# Patient Record
Sex: FEMALE | Race: OTHER | HISPANIC OR LATINO | URBAN - METROPOLITAN AREA
[De-identification: names, ages, dates, MRNs, and addresses within clinical notes are randomized per-mention and may not be internally consistent; named-entity substitution may affect disease eponyms.]

---

## 2022-10-14 ENCOUNTER — INPATIENT (INPATIENT)
Facility: HOSPITAL | Age: 72
LOS: 3 days | Discharge: HOME | DRG: 872 | End: 2022-10-18
Attending: INTERNAL MEDICINE | Admitting: INTERNAL MEDICINE
Payer: MEDICAID

## 2022-10-14 VITALS
SYSTOLIC BLOOD PRESSURE: 122 MMHG | HEART RATE: 116 BPM | WEIGHT: 167.55 LBS | DIASTOLIC BLOOD PRESSURE: 78 MMHG | TEMPERATURE: 101 F | HEIGHT: 62.2 IN | OXYGEN SATURATION: 96 % | RESPIRATION RATE: 18 BRPM

## 2022-10-14 DIAGNOSIS — L02.91 CUTANEOUS ABSCESS, UNSPECIFIED: ICD-10-CM

## 2022-10-14 LAB
ALBUMIN SERPL ELPH-MCNC: 3.6 G/DL — SIGNIFICANT CHANGE UP (ref 3.5–5)
ALP SERPL-CCNC: 98 U/L — SIGNIFICANT CHANGE UP (ref 40–120)
ALT FLD-CCNC: 13 U/L DA — SIGNIFICANT CHANGE UP (ref 10–60)
ANION GAP SERPL CALC-SCNC: 6 MMOL/L — SIGNIFICANT CHANGE UP (ref 5–17)
APPEARANCE UR: CLEAR — SIGNIFICANT CHANGE UP
APTT BLD: 28.5 SEC — SIGNIFICANT CHANGE UP (ref 27.5–35.5)
AST SERPL-CCNC: 13 U/L — SIGNIFICANT CHANGE UP (ref 10–40)
BACTERIA # UR AUTO: ABNORMAL /HPF
BASOPHILS # BLD AUTO: 0.02 K/UL — SIGNIFICANT CHANGE UP (ref 0–0.2)
BASOPHILS NFR BLD AUTO: 0.1 % — SIGNIFICANT CHANGE UP (ref 0–2)
BILIRUB SERPL-MCNC: 0.8 MG/DL — SIGNIFICANT CHANGE UP (ref 0.2–1.2)
BILIRUB UR-MCNC: NEGATIVE — SIGNIFICANT CHANGE UP
BUN SERPL-MCNC: 18 MG/DL — SIGNIFICANT CHANGE UP (ref 7–18)
CALCIUM SERPL-MCNC: 9.6 MG/DL — SIGNIFICANT CHANGE UP (ref 8.4–10.5)
CHLORIDE SERPL-SCNC: 104 MMOL/L — SIGNIFICANT CHANGE UP (ref 96–108)
CO2 SERPL-SCNC: 28 MMOL/L — SIGNIFICANT CHANGE UP (ref 22–31)
COLOR SPEC: ABNORMAL
CREAT SERPL-MCNC: 0.94 MG/DL — SIGNIFICANT CHANGE UP (ref 0.5–1.3)
DIFF PNL FLD: NEGATIVE — SIGNIFICANT CHANGE UP
EGFR: 65 ML/MIN/1.73M2 — SIGNIFICANT CHANGE UP
EOSINOPHIL # BLD AUTO: 0.09 K/UL — SIGNIFICANT CHANGE UP (ref 0–0.5)
EOSINOPHIL NFR BLD AUTO: 0.5 % — SIGNIFICANT CHANGE UP (ref 0–6)
EPI CELLS # UR: ABNORMAL /HPF
GLUCOSE SERPL-MCNC: 105 MG/DL — HIGH (ref 70–99)
GLUCOSE UR QL: NEGATIVE — SIGNIFICANT CHANGE UP
HCT VFR BLD CALC: 39 % — SIGNIFICANT CHANGE UP (ref 34.5–45)
HGB BLD-MCNC: 13.2 G/DL — SIGNIFICANT CHANGE UP (ref 11.5–15.5)
IMM GRANULOCYTES NFR BLD AUTO: 0.4 % — SIGNIFICANT CHANGE UP (ref 0–0.9)
INR BLD: 1.09 RATIO — SIGNIFICANT CHANGE UP (ref 0.88–1.16)
KETONES UR-MCNC: ABNORMAL
LACTATE SERPL-SCNC: 1.2 MMOL/L — SIGNIFICANT CHANGE UP (ref 0.7–2)
LEUKOCYTE ESTERASE UR-ACNC: ABNORMAL
LYMPHOCYTES # BLD AUTO: 18.6 % — SIGNIFICANT CHANGE UP (ref 13–44)
LYMPHOCYTES # BLD AUTO: 3.12 K/UL — SIGNIFICANT CHANGE UP (ref 1–3.3)
MCHC RBC-ENTMCNC: 30.1 PG — SIGNIFICANT CHANGE UP (ref 27–34)
MCHC RBC-ENTMCNC: 33.8 GM/DL — SIGNIFICANT CHANGE UP (ref 32–36)
MCV RBC AUTO: 88.8 FL — SIGNIFICANT CHANGE UP (ref 80–100)
MONOCYTES # BLD AUTO: 1.41 K/UL — HIGH (ref 0–0.9)
MONOCYTES NFR BLD AUTO: 8.4 % — SIGNIFICANT CHANGE UP (ref 2–14)
NEUTROPHILS # BLD AUTO: 12.1 K/UL — HIGH (ref 1.8–7.4)
NEUTROPHILS NFR BLD AUTO: 72 % — SIGNIFICANT CHANGE UP (ref 43–77)
NITRITE UR-MCNC: NEGATIVE — SIGNIFICANT CHANGE UP
NRBC # BLD: 0 /100 WBCS — SIGNIFICANT CHANGE UP (ref 0–0)
PH UR: 5 — SIGNIFICANT CHANGE UP (ref 5–8)
PLATELET # BLD AUTO: 305 K/UL — SIGNIFICANT CHANGE UP (ref 150–400)
POTASSIUM SERPL-MCNC: 3.9 MMOL/L — SIGNIFICANT CHANGE UP (ref 3.5–5.3)
POTASSIUM SERPL-SCNC: 3.9 MMOL/L — SIGNIFICANT CHANGE UP (ref 3.5–5.3)
PROT SERPL-MCNC: 7.9 G/DL — SIGNIFICANT CHANGE UP (ref 6–8.3)
PROT UR-MCNC: 30 MG/DL
PROTHROM AB SERPL-ACNC: 13 SEC — SIGNIFICANT CHANGE UP (ref 10.5–13.4)
RBC # BLD: 4.39 M/UL — SIGNIFICANT CHANGE UP (ref 3.8–5.2)
RBC # FLD: 12.7 % — SIGNIFICANT CHANGE UP (ref 10.3–14.5)
RBC CASTS # UR COMP ASSIST: SIGNIFICANT CHANGE UP /HPF (ref 0–2)
SARS-COV-2 RNA SPEC QL NAA+PROBE: SIGNIFICANT CHANGE UP
SODIUM SERPL-SCNC: 138 MMOL/L — SIGNIFICANT CHANGE UP (ref 135–145)
SP GR SPEC: 1.02 — SIGNIFICANT CHANGE UP (ref 1.01–1.02)
UROBILINOGEN FLD QL: NEGATIVE — SIGNIFICANT CHANGE UP
WBC # BLD: 16.8 K/UL — HIGH (ref 3.8–10.5)
WBC # FLD AUTO: 16.8 K/UL — HIGH (ref 3.8–10.5)
WBC UR QL: SIGNIFICANT CHANGE UP /HPF (ref 0–5)

## 2022-10-14 PROCEDURE — 99285 EMERGENCY DEPT VISIT HI MDM: CPT

## 2022-10-14 PROCEDURE — 71045 X-RAY EXAM CHEST 1 VIEW: CPT | Mod: 26

## 2022-10-14 RX ORDER — SODIUM CHLORIDE 9 MG/ML
2400 INJECTION INTRAMUSCULAR; INTRAVENOUS; SUBCUTANEOUS ONCE
Refills: 0 | Status: COMPLETED | OUTPATIENT
Start: 2022-10-14 | End: 2022-10-14

## 2022-10-14 RX ADMIN — Medication 600 MILLIGRAM(S): at 18:15

## 2022-10-14 RX ADMIN — SODIUM CHLORIDE 2400 MILLILITER(S): 9 INJECTION INTRAMUSCULAR; INTRAVENOUS; SUBCUTANEOUS at 18:02

## 2022-10-14 RX ADMIN — SODIUM CHLORIDE 2400 MILLILITER(S): 9 INJECTION INTRAMUSCULAR; INTRAVENOUS; SUBCUTANEOUS at 17:02

## 2022-10-14 RX ADMIN — Medication 100 MILLIGRAM(S): at 17:45

## 2022-10-14 NOTE — ED PROVIDER NOTE - OBJECTIVE STATEMENT
73 y/o female comes in complaining of 3 days of vulvar pain with a pimple that is getting bigger and painful. Patient has a fever and is not a diabetic. NKDA.

## 2022-10-14 NOTE — H&P ADULT - ASSESSMENT
72F visiting from [facility], ambulates independently, with PMHx HTN p/w vulvar pain x3d, with associated fever and chills. Patient being admitted for sepsis 2/2 vulvitis w/ cellulitis. GYN consulted.

## 2022-10-14 NOTE — H&P ADULT - PROBLEM SELECTOR PLAN 2
p/w worsening vulvar pain, with associated fever and chills x3d  On exam, left vulva noted to be hard w/ induration w/ surrounding erythema and warmth, extending to left buttock, c/w LEFT vulvitis w/ cellulitis. No area of fluctuance or drainage. No vaginal discharge.  Found to be septic, given IV bolus and IV abx in the ED   will start vanc and cefepime for SSTI, as clinda has higher risk for vaginitis   GYN consulted - not able to santy at this time, c/w IV abx   ID: Dr. Shahid

## 2022-10-14 NOTE — H&P ADULT - HISTORY OF PRESENT ILLNESS
72F visiting from [facility], ambulates independently, with PMHx HTN p/w vulvar pain x3d. Patient states that vulvar pain first began as pimple, which became itchy, and later bigger and more painful. She also endorses fever with chills. She denies any active drainage from the site, but states that the pimple has been getting harder. She denies any associated urinary symptoms. No reported prior history of the vaginal/ Vulvar problems. No reported h/o DM. Patient denies HA, chest pain, SOB, abdominal pain, or changes in BM.  72F visiting from [facility], ambulates independently, with PMHx HTN p/w vulvar pain x3d. Patient states that vulvar pain first began as pimple, which became itchy, and later bigger and more painful. She also endorses fever with chills. She denies any active drainage from the site, but states that the pimple has been getting harder. She denies any associated urinary symptoms. She denies any recent illness or sick contact. No reported prior history of the vaginal/ Vulvar problems. No reported h/o DM. Patient denies HA, chest pain, SOB, cough, abdominal pain, or changes in BM.

## 2022-10-14 NOTE — H&P ADULT - ATTENDING COMMENTS
72F visiting from [facility], ambulates independently, with PMHx HTN p/w vulvar pain x3d. Patient states that vulvar pain first began as pimple, which became itchy, and later bigger and more painful. She also endorses fever with chills. She denies any active drainage from the site, but states that the pimple has been getting harder. She denies any associated urinary symptoms. She denies any recent illness or sick contact. No reported prior history of the vaginal/ Vulvar problems. No reported h/o DM. Patient denies HA, chest pain, SOB, cough, abdominal pain, or changes in BM.        assessment  --  sepsis, left vulvitis with cellulitis, h/o htn    plan  --  admit to med, start vanc and cefepime, cont preadmit home meds, gi and dvt prophylaxis  cbc, bmp, mg, phos, lipids, tsh, bld cx, ucx,    id cons  gyn f/u

## 2022-10-14 NOTE — H&P ADULT - PROBLEM SELECTOR PLAN 1
p/w fever 101F, WBC >16K, lactate wnl,  /78  Likely 2/2 left vulvitis w/ cellulitis   UA -ve   s/p clindamycin x1 and 2.4L NS bolus in the ED   will start vanc and cefepime for SSTI, as clinda has higher risk for vaginitis   c/w IVF @ 100 cc NS x12h  f/u UCx and BCx, already sent  GYN following  ID: Dr. Shahid p/w fever 101F, WBC >16K, lactate wnl,  /78  Likely 2/2 left vulvitis w/ cellulitis   UA -ve   s/p clindamycin x1 and 2.4L NS bolus in the ED   c/w clindamycin 600mg q6  c/w IVF @ 100 cc NS x12h  f/u UCx and BCx, already sent  GYN following  ID: Dr. Shahid

## 2022-10-14 NOTE — ED PROVIDER NOTE - SKIN, MLM
Skin normal color for race, warm, dry and intact. Cellulitis on mons pubis, vulvar abscess on the left side with induration on the labia majora.

## 2022-10-14 NOTE — ED ADULT NURSE NOTE - ED STAT RN HANDOFF DETAILS
pt. remained stable. denies pain. admitted to medicine for PNA. transfer to [facility] area. report given to latrice ng. no acute distress noted

## 2022-10-14 NOTE — H&P ADULT - ATTENDING SUPERVISION STATEMENT
Detail Level: Generalized Detail Level: Zone Detail Level: Detailed Patient Specific Counseling (Will Not Stick From Patient To Patient): Patient advised to use zinc sunscreen Resident

## 2022-10-14 NOTE — H&P ADULT - NSHPPHYSICALEXAM_GEN_ALL_CORE
Vital Signs Last 24 Hrs  T(C): 37.3 (14 Oct 2022 20:15), Max: 38.3 (14 Oct 2022 14:04)  T(F): 99.2 (14 Oct 2022 20:15), Max: 101 (14 Oct 2022 14:04)  HR: 108 (14 Oct 2022 20:15) (108 - 116)  BP: 109/62 (14 Oct 2022 20:15) (109/62 - 122/78)  BP(mean): --  RR: 18 (14 Oct 2022 20:15) (18 - 18)  SpO2: 96% (14 Oct 2022 20:15) (96% - 96%)    Parameters below as of 14 Oct 2022 20:15  Patient On (Oxygen Delivery Method): room air    GENERAL: NAD, lying in bed comfortably  HEAD:  Atraumatic, Normocephalic  EYES: EOMI, PERRLA, conjunctiva and sclera clear  ENT: Moist mucous membranes  NECK: Supple, No JVD  CHEST/LUNG: Clear to auscultation bilaterally; No rales, rhonchi, wheezing, or rubs. Unlabored respirations  HEART: Regular rate and rhythm; No murmurs, rubs, or gallops  ABDOMEN: Bowel sounds present; Soft, Nontender, Nondistended.   EXTREMITIES:  2+ Peripheral Pulses, brisk capillary refill. No clubbing, cyanosis, or edema  NERVOUS SYSTEM:  Alert & Oriented X3, speech clear. No deficits   : (+) LEFT vulvitis, noted with surrounding erythema and warmth, extending to left buttock (+) left vulva hard with induration without area of fluctuance or drainage    Extremities: Nontender, no edema  MSK: FROM all 4 extremities, full and equal strength  SKIN: No rashes or lesions Vital Signs Last 24 Hrs  T(C): 37.3 (14 Oct 2022 20:15), Max: 38.3 (14 Oct 2022 14:04)  T(F): 99.2 (14 Oct 2022 20:15), Max: 101 (14 Oct 2022 14:04)  HR: 108 (14 Oct 2022 20:15) (108 - 116)  BP: 109/62 (14 Oct 2022 20:15) (109/62 - 122/78)  BP(mean): --  RR: 18 (14 Oct 2022 20:15) (18 - 18)  SpO2: 96% (14 Oct 2022 20:15) (96% - 96%)    Parameters below as of 14 Oct 2022 20:15  Patient On (Oxygen Delivery Method): room air    GENERAL: NAD, lying in bed comfortably  HEAD:  Atraumatic, Normocephalic  EYES: EOMI, PERRLA, conjunctiva and sclera clear  ENT: Moist mucous membranes  NECK: Supple, No JVD  CHEST/LUNG: Clear to auscultation bilaterally; No rales, rhonchi, wheezing, or rubs. Unlabored respirations  HEART: Regular rate and rhythm; No murmurs, rubs, or gallops  ABDOMEN: Bowel sounds present; Soft, Nontender, Nondistended.   EXTREMITIES:  2+ Peripheral Pulses, brisk capillary refill. No clubbing, cyanosis, or edema  NERVOUS SYSTEM:  Alert & Oriented X3, speech clear. No deficits   : (+) LEFT vulvitis, noted with surrounding erythema and warmth, extending to left buttock (+) left vulva hard with induration without area of fluctuance or drainage. No vaginal discharge.    Extremities: Nontender, no edema  MSK: FROM all 4 extremities, full and equal strength  SKIN: No rashes or lesions

## 2022-10-14 NOTE — CONSULT NOTE ADULT - SUBJECTIVE AND OBJECTIVE BOX
72 year old  presents to ED with complaint of vaginal abscess for the past 4 days. Pt states it started as a left sided pimple which quickly progressed to a large abscess. Pt c/o pain and discomfort with walking. Pt also states she had fever at home. Pt denies n/v/d, chest pain, sob, dizziness, palpitations     POb/gynhx:    x 5   s/p total hysterectomy for menorrhagia   denies hx of ovarian cysts, fibroids    Allergies: NKA  Meds: antihypertensive med (does not recall name)   PMHx: HTN  PSHx: Total abdominal hysterectomy   PsocHx: denies x 3     REVIEW OF SYSTEMS  see HPI       Vital Signs Last 24 Hrs  T(C): 37.3 (14 Oct 2022 20:15), Max: 38.3 (14 Oct 2022 14:04)  T(F): 99.2 (14 Oct 2022 20:15), Max: 101 (14 Oct 2022 14:04)  HR: 108 (14 Oct 2022 20:15) (108 - 116)  BP: 109/62 (14 Oct 2022 20:15) (109/62 - 122/78)  BP(mean): --  RR: 18 (14 Oct 2022 20:15) (18 - 18)  SpO2: 96% (14 Oct 2022 20:15) (96% - 96%)    Parameters below as of 14 Oct 2022 20:15  Patient On (Oxygen Delivery Method): room air        PHYSICAL EXAM:    Gen: A&O x 3, NAD  Abdomen: +BS; soft; Nontender, nondistended, no rebound or guarding   Gyn: left vulvitis noted with surrounding erythema and warmth extending to left buttock, left vulva hard with induration without area of fluctuance   Extremities: Nontender, no edema    LABS:                        13.2   16.80 )-----------( 305      ( 14 Oct 2022 16:45 )             39.0     10-14    138  |  104  |  18  ----------------------------<  105<H>  3.9   |  28  |  0.94    Ca    9.6      14 Oct 2022 16:45    TPro  7.9  /  Alb  3.6  /  TBili  0.8  /  DBili  x   /  AST  13  /  ALT  13  /  AlkPhos  98  10-14    PT/INR - ( 14 Oct 2022 16:45 )   PT: 13.0 sec;   INR: 1.09 ratio         PTT - ( 14 Oct 2022 16:45 )  PTT:28.5 sec  Urinalysis Basic - ( 14 Oct 2022 16:45 )    Color: Deirdre / Appearance: Clear / S.025 / pH: x  Gluc: x / Ketone: Trace  / Bili: Negative / Urobili: Negative   Blood: x / Protein: 30 mg/dL / Nitrite: Negative   Leuk Esterase: Trace / RBC: 0-2 /HPF / WBC 3-5 /HPF   Sq Epi: x / Non Sq Epi: Occasional /HPF / Bacteria: Few /HPF

## 2022-10-14 NOTE — H&P ADULT - PROBLEM SELECTOR PLAN 3
h/o HTN but unable to recall BP medication, she states daughter showed ED attending medication, no mention of medication name in the chart. Called daughter twice with no answer.  Primary team to confirm medication in the AM.  will hold off on BP meds for now in the setting of sepsis, BP lowered to 109/62  Monitor BP

## 2022-10-14 NOTE — CONSULT NOTE ADULT - ASSESSMENT
72 year old with left vulvitis; stable   -not able to santy at this time   -start IV antibiotics, recommend ID consult   -gyn to follow   -pt seen with Dr. Hardwick

## 2022-10-15 DIAGNOSIS — I10 ESSENTIAL (PRIMARY) HYPERTENSION: ICD-10-CM

## 2022-10-15 DIAGNOSIS — N76.2 ACUTE VULVITIS: ICD-10-CM

## 2022-10-15 DIAGNOSIS — L03.90 CELLULITIS, UNSPECIFIED: ICD-10-CM

## 2022-10-15 DIAGNOSIS — Z29.9 ENCOUNTER FOR PROPHYLACTIC MEASURES, UNSPECIFIED: ICD-10-CM

## 2022-10-15 LAB
ALBUMIN SERPL ELPH-MCNC: 3 G/DL — LOW (ref 3.5–5)
ALP SERPL-CCNC: 80 U/L — SIGNIFICANT CHANGE UP (ref 40–120)
ALT FLD-CCNC: 11 U/L DA — SIGNIFICANT CHANGE UP (ref 10–60)
ANION GAP SERPL CALC-SCNC: 9 MMOL/L — SIGNIFICANT CHANGE UP (ref 5–17)
AST SERPL-CCNC: 9 U/L — LOW (ref 10–40)
BASOPHILS # BLD AUTO: 0.02 K/UL — SIGNIFICANT CHANGE UP (ref 0–0.2)
BASOPHILS NFR BLD AUTO: 0.2 % — SIGNIFICANT CHANGE UP (ref 0–2)
BILIRUB SERPL-MCNC: 0.8 MG/DL — SIGNIFICANT CHANGE UP (ref 0.2–1.2)
BUN SERPL-MCNC: 15 MG/DL — SIGNIFICANT CHANGE UP (ref 7–18)
CALCIUM SERPL-MCNC: 9.4 MG/DL — SIGNIFICANT CHANGE UP (ref 8.4–10.5)
CHLORIDE SERPL-SCNC: 107 MMOL/L — SIGNIFICANT CHANGE UP (ref 96–108)
CO2 SERPL-SCNC: 26 MMOL/L — SIGNIFICANT CHANGE UP (ref 22–31)
CREAT SERPL-MCNC: 0.83 MG/DL — SIGNIFICANT CHANGE UP (ref 0.5–1.3)
EGFR: 75 ML/MIN/1.73M2 — SIGNIFICANT CHANGE UP
EOSINOPHIL # BLD AUTO: 0.33 K/UL — SIGNIFICANT CHANGE UP (ref 0–0.5)
EOSINOPHIL NFR BLD AUTO: 2.6 % — SIGNIFICANT CHANGE UP (ref 0–6)
GLUCOSE SERPL-MCNC: 97 MG/DL — SIGNIFICANT CHANGE UP (ref 70–99)
HCT VFR BLD CALC: 35.9 % — SIGNIFICANT CHANGE UP (ref 34.5–45)
HCV AB S/CO SERPL IA: 0.23 S/CO — SIGNIFICANT CHANGE UP (ref 0–0.99)
HCV AB SERPL-IMP: SIGNIFICANT CHANGE UP
HGB BLD-MCNC: 11.7 G/DL — SIGNIFICANT CHANGE UP (ref 11.5–15.5)
IMM GRANULOCYTES NFR BLD AUTO: 0.4 % — SIGNIFICANT CHANGE UP (ref 0–0.9)
LYMPHOCYTES # BLD AUTO: 2.84 K/UL — SIGNIFICANT CHANGE UP (ref 1–3.3)
LYMPHOCYTES # BLD AUTO: 22.6 % — SIGNIFICANT CHANGE UP (ref 13–44)
MAGNESIUM SERPL-MCNC: 1.9 MG/DL — SIGNIFICANT CHANGE UP (ref 1.6–2.6)
MCHC RBC-ENTMCNC: 29.3 PG — SIGNIFICANT CHANGE UP (ref 27–34)
MCHC RBC-ENTMCNC: 32.6 GM/DL — SIGNIFICANT CHANGE UP (ref 32–36)
MCV RBC AUTO: 89.8 FL — SIGNIFICANT CHANGE UP (ref 80–100)
MONOCYTES # BLD AUTO: 1.1 K/UL — HIGH (ref 0–0.9)
MONOCYTES NFR BLD AUTO: 8.7 % — SIGNIFICANT CHANGE UP (ref 2–14)
NEUTROPHILS # BLD AUTO: 8.25 K/UL — HIGH (ref 1.8–7.4)
NEUTROPHILS NFR BLD AUTO: 65.5 % — SIGNIFICANT CHANGE UP (ref 43–77)
NRBC # BLD: 0 /100 WBCS — SIGNIFICANT CHANGE UP (ref 0–0)
PHOSPHATE SERPL-MCNC: 2.7 MG/DL — SIGNIFICANT CHANGE UP (ref 2.5–4.5)
PLATELET # BLD AUTO: 292 K/UL — SIGNIFICANT CHANGE UP (ref 150–400)
POTASSIUM SERPL-MCNC: 3.7 MMOL/L — SIGNIFICANT CHANGE UP (ref 3.5–5.3)
POTASSIUM SERPL-SCNC: 3.7 MMOL/L — SIGNIFICANT CHANGE UP (ref 3.5–5.3)
PROT SERPL-MCNC: 6.8 G/DL — SIGNIFICANT CHANGE UP (ref 6–8.3)
RBC # BLD: 4 M/UL — SIGNIFICANT CHANGE UP (ref 3.8–5.2)
RBC # FLD: 12.8 % — SIGNIFICANT CHANGE UP (ref 10.3–14.5)
SODIUM SERPL-SCNC: 142 MMOL/L — SIGNIFICANT CHANGE UP (ref 135–145)
WBC # BLD: 12.59 K/UL — HIGH (ref 3.8–10.5)
WBC # FLD AUTO: 12.59 K/UL — HIGH (ref 3.8–10.5)

## 2022-10-15 PROCEDURE — 93010 ELECTROCARDIOGRAM REPORT: CPT

## 2022-10-15 PROCEDURE — 74176 CT ABD & PELVIS W/O CONTRAST: CPT | Mod: 26

## 2022-10-15 RX ORDER — SODIUM CHLORIDE 9 MG/ML
1000 INJECTION INTRAMUSCULAR; INTRAVENOUS; SUBCUTANEOUS
Refills: 0 | Status: DISCONTINUED | OUTPATIENT
Start: 2022-10-15 | End: 2022-10-15

## 2022-10-15 RX ORDER — SODIUM CHLORIDE 9 MG/ML
1000 INJECTION INTRAMUSCULAR; INTRAVENOUS; SUBCUTANEOUS
Refills: 0 | Status: DISCONTINUED | OUTPATIENT
Start: 2022-10-15 | End: 2022-10-18

## 2022-10-15 RX ORDER — AMPICILLIN SODIUM AND SULBACTAM SODIUM 250; 125 MG/ML; MG/ML
3 INJECTION, POWDER, FOR SUSPENSION INTRAMUSCULAR; INTRAVENOUS ONCE
Refills: 0 | Status: COMPLETED | OUTPATIENT
Start: 2022-10-15 | End: 2022-10-15

## 2022-10-15 RX ORDER — AMPICILLIN SODIUM AND SULBACTAM SODIUM 250; 125 MG/ML; MG/ML
INJECTION, POWDER, FOR SUSPENSION INTRAMUSCULAR; INTRAVENOUS
Refills: 0 | Status: DISCONTINUED | OUTPATIENT
Start: 2022-10-15 | End: 2022-10-18

## 2022-10-15 RX ORDER — ACETAMINOPHEN 500 MG
650 TABLET ORAL EVERY 6 HOURS
Refills: 0 | Status: DISCONTINUED | OUTPATIENT
Start: 2022-10-15 | End: 2022-10-18

## 2022-10-15 RX ORDER — ENOXAPARIN SODIUM 100 MG/ML
40 INJECTION SUBCUTANEOUS EVERY 24 HOURS
Refills: 0 | Status: DISCONTINUED | OUTPATIENT
Start: 2022-10-15 | End: 2022-10-18

## 2022-10-15 RX ORDER — AMPICILLIN SODIUM AND SULBACTAM SODIUM 250; 125 MG/ML; MG/ML
3 INJECTION, POWDER, FOR SUSPENSION INTRAMUSCULAR; INTRAVENOUS EVERY 6 HOURS
Refills: 0 | Status: DISCONTINUED | OUTPATIENT
Start: 2022-10-16 | End: 2022-10-18

## 2022-10-15 RX ADMIN — AMPICILLIN SODIUM AND SULBACTAM SODIUM 200 GRAM(S): 250; 125 INJECTION, POWDER, FOR SUSPENSION INTRAMUSCULAR; INTRAVENOUS at 22:01

## 2022-10-15 RX ADMIN — SODIUM CHLORIDE 100 MILLILITER(S): 9 INJECTION INTRAMUSCULAR; INTRAVENOUS; SUBCUTANEOUS at 09:09

## 2022-10-15 RX ADMIN — SODIUM CHLORIDE 100 MILLILITER(S): 9 INJECTION INTRAMUSCULAR; INTRAVENOUS; SUBCUTANEOUS at 05:19

## 2022-10-15 RX ADMIN — SODIUM CHLORIDE 100 MILLILITER(S): 9 INJECTION INTRAMUSCULAR; INTRAVENOUS; SUBCUTANEOUS at 22:01

## 2022-10-15 RX ADMIN — Medication 100 MILLIGRAM(S): at 17:07

## 2022-10-15 RX ADMIN — Medication 100 MILLIGRAM(S): at 09:17

## 2022-10-15 RX ADMIN — ENOXAPARIN SODIUM 40 MILLIGRAM(S): 100 INJECTION SUBCUTANEOUS at 05:17

## 2022-10-15 NOTE — CONSULT NOTE ADULT - ASSESSMENT
Patient is a 72y old  Female who is visiting from [facility], ambulates independently, with PMHx HTN p/w vulvar pain x3d. Patient states that vulvar pain first began as pimple, which became itchy, and later bigger and more painful. She also endorses fever with chills. She denies any active drainage from the site, but states that the pimple has been getting harder. ON admission, she found to have fever, tachycardia and Leukocytosis. She has started on Clindamycin and the ID consult requested to assist with further evaluation and management.    # Sepsis ( Fever + Tachycardia + leukocytosis)   # Left Vulvitis    would recommend:    1. Follow up Blood cultures  2. Please change Clindamycin to Unasyn until work up is done  3. Monitor WBC count    will follow the patient with you and make further recommendation based on the clinical course and Lab results  Thank you for the opportunity to participate in Ms. REYESUCETA's care    Attending Attestation :    Spent more than 65 minutes on total encounter, more than 50 % of the visit was spent counseling and/or coordinating care by the Attending physician.     Patient is a 72y old  Female who is visiting from [facility], ambulates independently, with PMHx HTN p/w vulvar pain x3d. Patient states that vulvar pain first began as pimple, which became itchy, and later bigger and more painful. She also endorses fever with chills. She denies any active drainage from the site, but states that the pimple has been getting harder. ON admission, she found to have fever, tachycardia and Leukocytosis. She has started on Clindamycin and the ID consult requested to assist with further evaluation and management.    # Sepsis ( Fever + Tachycardia + leukocytosis)   # Left Vulvitis    would recommend:    1. Follow up Blood cultures  2. Please change Clindamycin to Unasyn until work up is done  3. Monitor WBC count  4. Pain  management as needed    will follow the patient with you and make further recommendation based on the clinical course and Lab results  Thank you for the opportunity to participate in Ms. REYESUCETA's care    Attending Attestation :    Spent more than 65 minutes on total encounter, more than 50 % of the visit was spent counseling and/or coordinating care by the Attending physician.

## 2022-10-15 NOTE — CONSULT NOTE ADULT - SUBJECTIVE AND OBJECTIVE BOX
Patient is a 72y old  Female who is visiting from [facility], ambulates independently, with PMHx HTN p/w vulvar pain x3d. Patient states that vulvar pain first began as pimple, which became itchy, and later bigger and more painful. She also endorses fever with chills. She denies any active drainage from the site, but states that the pimple has been getting harder. ON admission, she found to have fever, tachycardia and Leukocytosis. She has started on Clindamycin and the ID consult requested to assist with further evaluation and management.        REVIEW OF SYSTEMS: Total of twelve systems have been reviewed with patient and found to be negative unless mentioned in HPI      PAST MEDICAL & SURGICAL HISTORY:  HTN (hypertension)      SOCIAL HISTORY  Alcohol: Does not drink  Tobacco: Does not smoke  Illicit substance use: None      FAMILY HISTORY: Non contributory to the present illness        ALLERGIES: No Known Allergies        \Vital Signs Last 24 Hrs  T(C): 36.9 (15 Oct 2022 11:30), Max: 38.3 (14 Oct 2022 14:04)  T(F): 98.4 (15 Oct 2022 11:30), Max: 101 (14 Oct 2022 14:04)  HR: 100 (15 Oct 2022 11:30) (88 - 116)  BP: 120/69 (15 Oct 2022 11:30) (95/57 - 122/78)  BP(mean): --  RR: 17 (15 Oct 2022 11:30) (17 - 18)  SpO2: 97% (15 Oct 2022 11:30) (96% - 98%)    Parameters below as of 15 Oct 2022 11:30  Patient On (Oxygen Delivery Method): room air      PHYSICAL EXAM:  GENERAL: Not in distress   CHEST/LUNG: Not using accessory muscles   HEART: s1 and s2 present  ABDOMEN:  Nontender and  Nondistended  EXTREMITIES: No pedal  edema  CNS: Awake and Alert      LABS:                        11.7   12.59 )-----------( 292      ( 15 Oct 2022 05:15 )             35.9     10-15    142  |  107  |  15  ----------------------------<  97  3.7   |  26  |  0.83    Ca    9.4      15 Oct 2022 05:15  Phos  2.7     10-15  Mg     1.9     10-15    TPro  6.8  /  Alb  3.0<L>  /  TBili  0.8  /  DBili  x   /  AST  9<L>  /  ALT  11  /  AlkPhos  80  10-15    PT/INR - ( 14 Oct 2022 16:45 )   PT: 13.0 sec;   INR: 1.09 ratio      PTT - ( 14 Oct 2022 16:45 )  PTT:28.5 sec        Urinalysis Basic - ( 14 Oct 2022 16:45 )  Color: Deirdre / Appearance: Clear / S.025 / pH: x  Gluc: x / Ketone: Trace  / Bili: Negative / Urobili: Negative   Blood: x / Protein: 30 mg/dL / Nitrite: Negative   Leuk Esterase: Trace / RBC: 0-2 /HPF / WBC 3-5 /HPF   Sq Epi: x / Non Sq Epi: Occasional /HPF / Bacteria: Few /HPF        MEDICATIONS  (STANDING):  clindamycin IVPB      clindamycin IVPB 600 milliGRAM(s) IV Intermittent every 8 hours  enoxaparin Injectable 40 milliGRAM(s) SubCutaneous every 24 hours  sodium chloride 0.9%. 1000 milliLiter(s) (100 mL/Hr) IV Continuous <Continuous>    MEDICATIONS  (PRN):  acetaminophen     Tablet .. 650 milliGRAM(s) Oral every 6 hours PRN Temp greater or equal to 38C (100.4F), Mild Pain (1 - 3)        RADIOLOGY & ADDITIONAL TESTS:    COVID-19 PCR (10.14.22 @ 16:45)   COVID-19 PCR: NotDetec:            Patient is a 72y old  Female who is visiting from [facility], ambulates independently, with PMHx HTN p/w vulvar pain x3d. Patient states that vulvar pain first began as pimple, which became itchy, and later bigger and more painful. She also endorses fever with chills. She denies any active drainage from the site, but states that the pimple has been getting harder. ON admission, she found to have fever, tachycardia and Leukocytosis. She has started on Clindamycin and the ID consult requested to assist with further evaluation and management.        REVIEW OF SYSTEMS: Total of twelve systems have been reviewed with patient and found to be negative unless mentioned in HPI      PAST MEDICAL & SURGICAL HISTORY:  HTN (hypertension)      SOCIAL HISTORY  Alcohol: Does not drink  Tobacco: Does not smoke  Illicit substance use: None      FAMILY HISTORY: Non contributory to the present illness        ALLERGIES: No Known Allergies        \Vital Signs Last 24 Hrs  T(C): 36.9 (15 Oct 2022 11:30), Max: 38.3 (14 Oct 2022 14:04)  T(F): 98.4 (15 Oct 2022 11:30), Max: 101 (14 Oct 2022 14:04)  HR: 100 (15 Oct 2022 11:30) (88 - 116)  BP: 120/69 (15 Oct 2022 11:30) (95/57 - 122/78)  BP(mean): --  RR: 17 (15 Oct 2022 11:30) (17 - 18)  SpO2: 97% (15 Oct 2022 11:30) (96% - 98%)    Parameters below as of 15 Oct 2022 11:30  Patient On (Oxygen Delivery Method): room air      PHYSICAL EXAM:  GENERAL: Not in distress   CHEST/LUNG: Not using accessory muscles   HEART: s1 and s2 present  ABDOMEN:  Nontender and  Nondistended  : Left vulva red and indurated ,Please refer to OB /GYN note for full description  EXTREMITIES: No pedal  edema  CNS: Awake and Alert      LABS:                        11.7   12.59 )-----------( 292      ( 15 Oct 2022 05:15 )             35.9     10-15    142  |  107  |  15  ----------------------------<  97  3.7   |  26  |  0.83    Ca    9.4      15 Oct 2022 05:15  Phos  2.7     10-15  Mg     1.9     10-15    TPro  6.8  /  Alb  3.0<L>  /  TBili  0.8  /  DBili  x   /  AST  9<L>  /  ALT  11  /  AlkPhos  80  10-15    PT/INR - ( 14 Oct 2022 16:45 )   PT: 13.0 sec;   INR: 1.09 ratio      PTT - ( 14 Oct 2022 16:45 )  PTT:28.5 sec        Urinalysis Basic - ( 14 Oct 2022 16:45 )  Color: Deirdre / Appearance: Clear / S.025 / pH: x  Gluc: x / Ketone: Trace  / Bili: Negative / Urobili: Negative   Blood: x / Protein: 30 mg/dL / Nitrite: Negative   Leuk Esterase: Trace / RBC: 0-2 /HPF / WBC 3-5 /HPF   Sq Epi: x / Non Sq Epi: Occasional /HPF / Bacteria: Few /HPF        MEDICATIONS  (STANDING):  clindamycin IVPB      clindamycin IVPB 600 milliGRAM(s) IV Intermittent every 8 hours  enoxaparin Injectable 40 milliGRAM(s) SubCutaneous every 24 hours  sodium chloride 0.9%. 1000 milliLiter(s) (100 mL/Hr) IV Continuous <Continuous>    MEDICATIONS  (PRN):  acetaminophen     Tablet .. 650 milliGRAM(s) Oral every 6 hours PRN Temp greater or equal to 38C (100.4F), Mild Pain (1 - 3)        RADIOLOGY & ADDITIONAL TESTS:    COVID-19 PCR (10.14.22 @ 16:45)   COVID-19 PCR: NotDetec:

## 2022-10-15 NOTE — PROGRESS NOTE ADULT - SUBJECTIVE AND OBJECTIVE BOX
Patient is a 72y old  Female who presents with a chief complaint of Vulvar pain (14 Oct 2022 22:55)    pt seen in icu [  ], reg med floor [   ], bed [  ], chair at bedside [   ], a+o x3 [  ], lethargic [  ],  nad [  ]    miranda [  ], ngt [  ], peg [  ], et tube [  ], cent line [  ], picc line [  ]        Allergies    No Known Allergies        Vitals    T(F): 98.4 (10-15-22 @ 04:24), Max: 101 (10-14-22 @ 14:04)  HR: 88 (10-15-22 @ 04:24) (88 - 116)  BP: 119/71 (10-15-22 @ 04:24) (95/57 - 122/78)  RR: 18 (10-15-22 @ 04:24) (18 - 18)  SpO2: 98% (10-15-22 @ 04:24) (96% - 98%)  Wt(kg): --  CAPILLARY BLOOD GLUCOSE          Labs                          11.7   12.59 )-----------( 292      ( 15 Oct 2022 05:15 )             35.9       10-15    142  |  107  |  15  ----------------------------<  97  3.7   |  26  |  0.83    Ca    9.4      15 Oct 2022 05:15  Phos  2.7     10-15  Mg     1.9     10-15    TPro  6.8  /  Alb  3.0<L>  /  TBili  0.8  /  DBili  x   /  AST  9<L>  /  ALT  11  /  AlkPhos  80  10-15                Radiology Results      Meds    MEDICATIONS  (STANDING):  enoxaparin Injectable 40 milliGRAM(s) SubCutaneous every 24 hours  sodium chloride 0.9%. 1000 milliLiter(s) (100 mL/Hr) IV Continuous <Continuous>      MEDICATIONS  (PRN):  acetaminophen     Tablet .. 650 milliGRAM(s) Oral every 6 hours PRN Temp greater or equal to 38C (100.4F), Mild Pain (1 - 3)      Physical Exam    Neuro :  no focal deficits  Respiratory: CTA B/L  CV: RRR, S1S2, no murmurs,   Abdominal: Soft, NT, ND +BS,  Extremities: No edema, + peripheral pulses    ASSESSMENT    Abscess of skin    HTN (hypertension)        PLAN     Patient is a 72y old  Female who presents with a chief complaint of Vulvar pain (14 Oct 2022 22:55)    pt seen in ed [ x ], reg med floor [   ], bed [ x ], chair at bedside [   ], a+o x3 [ x ], lethargic [  ],  nad [ x ]      Allergies    No Known Allergies        Vitals    T(F): 98.4 (10-15-22 @ 04:24), Max: 101 (10-14-22 @ 14:04)  HR: 88 (10-15-22 @ 04:24) (88 - 116)  BP: 119/71 (10-15-22 @ 04:24) (95/57 - 122/78)  RR: 18 (10-15-22 @ 04:24) (18 - 18)  SpO2: 98% (10-15-22 @ 04:24) (96% - 98%)  Wt(kg): --  CAPILLARY BLOOD GLUCOSE          Labs                          11.7   12.59 )-----------( 292      ( 15 Oct 2022 05:15 )             35.9       10-15    142  |  107  |  15  ----------------------------<  97  3.7   |  26  |  0.83    Ca    9.4      15 Oct 2022 05:15  Phos  2.7     10-15  Mg     1.9     10-15    TPro  6.8  /  Alb  3.0<L>  /  TBili  0.8  /  DBili  x   /  AST  9<L>  /  ALT  11  /  AlkPhos  80  10-15                Radiology Results      Meds    MEDICATIONS  (STANDING):  enoxaparin Injectable 40 milliGRAM(s) SubCutaneous every 24 hours  sodium chloride 0.9%. 1000 milliLiter(s) (100 mL/Hr) IV Continuous <Continuous>      MEDICATIONS  (PRN):  acetaminophen     Tablet .. 650 milliGRAM(s) Oral every 6 hours PRN Temp greater or equal to 38C (100.4F), Mild Pain (1 - 3)      Physical Exam    Neuro :  no focal deficits  Respiratory: CTA B/L  CV: RRR, S1S2, no murmurs,   Abdominal: Soft, NT, ND +BS,   genitourinary : left vulvitis noted with surrounding erythema and warmth extending to left buttock, left vulva hard with induration without area of fluctuance   Extremities: No edema, + peripheral pulses    ASSESSMENT    sepsis, left vulvitis with cellulitis,   h/o HTN (hypertension)      PLAN    start vanc and cefepime,   id cons   f/u bld cx, ucx,  leukocytosis improving  gyn f/u   not able to santy at this time   cont IV antibiotics,   cont ivf  cont current meds

## 2022-10-16 LAB
ALBUMIN SERPL ELPH-MCNC: 2.8 G/DL — LOW (ref 3.5–5)
ALP SERPL-CCNC: 83 U/L — SIGNIFICANT CHANGE UP (ref 40–120)
ALT FLD-CCNC: 11 U/L DA — SIGNIFICANT CHANGE UP (ref 10–60)
ANION GAP SERPL CALC-SCNC: 6 MMOL/L — SIGNIFICANT CHANGE UP (ref 5–17)
AST SERPL-CCNC: 13 U/L — SIGNIFICANT CHANGE UP (ref 10–40)
BASOPHILS # BLD AUTO: 0.03 K/UL — SIGNIFICANT CHANGE UP (ref 0–0.2)
BASOPHILS NFR BLD AUTO: 0.3 % — SIGNIFICANT CHANGE UP (ref 0–2)
BILIRUB SERPL-MCNC: 0.4 MG/DL — SIGNIFICANT CHANGE UP (ref 0.2–1.2)
BUN SERPL-MCNC: 8 MG/DL — SIGNIFICANT CHANGE UP (ref 7–18)
CALCIUM SERPL-MCNC: 9.1 MG/DL — SIGNIFICANT CHANGE UP (ref 8.4–10.5)
CHLORIDE SERPL-SCNC: 110 MMOL/L — HIGH (ref 96–108)
CO2 SERPL-SCNC: 26 MMOL/L — SIGNIFICANT CHANGE UP (ref 22–31)
CREAT SERPL-MCNC: 0.69 MG/DL — SIGNIFICANT CHANGE UP (ref 0.5–1.3)
CULTURE RESULTS: NO GROWTH — SIGNIFICANT CHANGE UP
EGFR: 93 ML/MIN/1.73M2 — SIGNIFICANT CHANGE UP
EOSINOPHIL # BLD AUTO: 0.5 K/UL — SIGNIFICANT CHANGE UP (ref 0–0.5)
EOSINOPHIL NFR BLD AUTO: 5.5 % — SIGNIFICANT CHANGE UP (ref 0–6)
GLUCOSE SERPL-MCNC: 96 MG/DL — SIGNIFICANT CHANGE UP (ref 70–99)
HCT VFR BLD CALC: 36 % — SIGNIFICANT CHANGE UP (ref 34.5–45)
HGB BLD-MCNC: 11.6 G/DL — SIGNIFICANT CHANGE UP (ref 11.5–15.5)
IMM GRANULOCYTES NFR BLD AUTO: 0.8 % — SIGNIFICANT CHANGE UP (ref 0–0.9)
LYMPHOCYTES # BLD AUTO: 1.85 K/UL — SIGNIFICANT CHANGE UP (ref 1–3.3)
LYMPHOCYTES # BLD AUTO: 20.2 % — SIGNIFICANT CHANGE UP (ref 13–44)
MAGNESIUM SERPL-MCNC: 2.1 MG/DL — SIGNIFICANT CHANGE UP (ref 1.6–2.6)
MCHC RBC-ENTMCNC: 28.8 PG — SIGNIFICANT CHANGE UP (ref 27–34)
MCHC RBC-ENTMCNC: 32.2 GM/DL — SIGNIFICANT CHANGE UP (ref 32–36)
MCV RBC AUTO: 89.3 FL — SIGNIFICANT CHANGE UP (ref 80–100)
MONOCYTES # BLD AUTO: 0.84 K/UL — SIGNIFICANT CHANGE UP (ref 0–0.9)
MONOCYTES NFR BLD AUTO: 9.2 % — SIGNIFICANT CHANGE UP (ref 2–14)
NEUTROPHILS # BLD AUTO: 5.85 K/UL — SIGNIFICANT CHANGE UP (ref 1.8–7.4)
NEUTROPHILS NFR BLD AUTO: 64 % — SIGNIFICANT CHANGE UP (ref 43–77)
NRBC # BLD: 0 /100 WBCS — SIGNIFICANT CHANGE UP (ref 0–0)
PHOSPHATE SERPL-MCNC: 2.7 MG/DL — SIGNIFICANT CHANGE UP (ref 2.5–4.5)
PLATELET # BLD AUTO: 338 K/UL — SIGNIFICANT CHANGE UP (ref 150–400)
POTASSIUM SERPL-MCNC: 3.4 MMOL/L — LOW (ref 3.5–5.3)
POTASSIUM SERPL-SCNC: 3.4 MMOL/L — LOW (ref 3.5–5.3)
PROT SERPL-MCNC: 7 G/DL — SIGNIFICANT CHANGE UP (ref 6–8.3)
RBC # BLD: 4.03 M/UL — SIGNIFICANT CHANGE UP (ref 3.8–5.2)
RBC # FLD: 12.8 % — SIGNIFICANT CHANGE UP (ref 10.3–14.5)
SODIUM SERPL-SCNC: 142 MMOL/L — SIGNIFICANT CHANGE UP (ref 135–145)
SPECIMEN SOURCE: SIGNIFICANT CHANGE UP
WBC # BLD: 9.14 K/UL — SIGNIFICANT CHANGE UP (ref 3.8–10.5)
WBC # FLD AUTO: 9.14 K/UL — SIGNIFICANT CHANGE UP (ref 3.8–10.5)

## 2022-10-16 RX ORDER — OXYCODONE AND ACETAMINOPHEN 5; 325 MG/1; MG/1
1 TABLET ORAL EVERY 6 HOURS
Refills: 0 | Status: DISCONTINUED | OUTPATIENT
Start: 2022-10-16 | End: 2022-10-18

## 2022-10-16 RX ADMIN — AMPICILLIN SODIUM AND SULBACTAM SODIUM 200 GRAM(S): 250; 125 INJECTION, POWDER, FOR SUSPENSION INTRAMUSCULAR; INTRAVENOUS at 18:30

## 2022-10-16 RX ADMIN — AMPICILLIN SODIUM AND SULBACTAM SODIUM 200 GRAM(S): 250; 125 INJECTION, POWDER, FOR SUSPENSION INTRAMUSCULAR; INTRAVENOUS at 13:38

## 2022-10-16 RX ADMIN — ENOXAPARIN SODIUM 40 MILLIGRAM(S): 100 INJECTION SUBCUTANEOUS at 06:00

## 2022-10-16 RX ADMIN — AMPICILLIN SODIUM AND SULBACTAM SODIUM 200 GRAM(S): 250; 125 INJECTION, POWDER, FOR SUSPENSION INTRAMUSCULAR; INTRAVENOUS at 00:18

## 2022-10-16 RX ADMIN — SODIUM CHLORIDE 100 MILLILITER(S): 9 INJECTION INTRAMUSCULAR; INTRAVENOUS; SUBCUTANEOUS at 06:00

## 2022-10-16 RX ADMIN — AMPICILLIN SODIUM AND SULBACTAM SODIUM 200 GRAM(S): 250; 125 INJECTION, POWDER, FOR SUSPENSION INTRAMUSCULAR; INTRAVENOUS at 05:58

## 2022-10-16 RX ADMIN — AMPICILLIN SODIUM AND SULBACTAM SODIUM 200 GRAM(S): 250; 125 INJECTION, POWDER, FOR SUSPENSION INTRAMUSCULAR; INTRAVENOUS at 23:57

## 2022-10-16 NOTE — PROGRESS NOTE ADULT - ASSESSMENT
Patient is a 72y old  Female who is visiting from [facility], ambulates independently, with PMHx HTN p/w vulvar pain x3d. Patient states that vulvar pain first began as pimple, which became itchy, and later bigger and more painful. She also endorses fever with chills. She denies any active drainage from the site, but states that the pimple has been getting harder. ON admission, she found to have fever, tachycardia and Leukocytosis. She has started on Clindamycin and the ID consult requested to assist with further evaluation and management.    # Sepsis ( Fever + Tachycardia + leukocytosis) - tresolving  # Left Vulvitis    would recommend:    1. Continue Unasyn until work up is done  2.  Warm compress at the site  3. Pain  management as needed      Attending Attestation :    Spent more than 45 minutes on total encounter, more than 50 % of the visit was spent counseling and/or coordinating care by the Attending physician. Patient is a 72y old  Female who is visiting from [facility], ambulates independently, with PMHx HTN p/w vulvar pain x3d. Patient states that vulvar pain first began as pimple, which became itchy, and later bigger and more painful. She also endorses fever with chills. She denies any active drainage from the site, but states that the pimple has been getting harder. ON admission, she found to have fever, tachycardia and Leukocytosis. She has started on Clindamycin and the ID consult requested to assist with further evaluation and management.    # Sepsis ( Fever + Tachycardia + leukocytosis) - resolving  # Left Vulvitis- improving     would recommend:    1. Please apply warm compress to the left vulva  2. Continue Unasyn until work up is done  3. Pain  management as needed  4. OOB to chair    -d/w Family at the bed side    Attending Attestation :    Spent more than 45 minutes on total encounter, more than 50 % of the visit was spent counseling and/or coordinating care by the Attending physician.

## 2022-10-16 NOTE — PATIENT PROFILE ADULT - STATED REASON FOR ADMISSION
Daughter reports that what started as a pimple on the vulva became bigger and pain. She also had fever and chills.

## 2022-10-16 NOTE — PROGRESS NOTE ADULT - SUBJECTIVE AND OBJECTIVE BOX
Patient is seen and examined at the bed side, is afebrile.        REVIEW OF SYSTEMS: All other review systems are negative      ALLERGIES: No Known Allergies      Vital Signs Last 24 Hrs  T(C): 36.9 (16 Oct 2022 12:13), Max: 37.3 (15 Oct 2022 16:41)  T(F): 98.4 (16 Oct 2022 12:13), Max: 99.1 (15 Oct 2022 16:41)  HR: 97 (16 Oct 2022 12:13) (91 - 98)  BP: 115/64 (16 Oct 2022 12:13) (115/64 - 132/67)  BP(mean): --  RR: 17 (16 Oct 2022 12:13) (17 - 17)  SpO2: 98% (16 Oct 2022 12:13) (97% - 98%)    Parameters below as of 16 Oct 2022 12:13  Patient On (Oxygen Delivery Method): room air          PHYSICAL EXAM:  GENERAL: Not in distress   CHEST/LUNG: Not using accessory muscles   HEART: s1 and s2 present  ABDOMEN:  Nontender and  Nondistended  : Left vulva red and indurated ,Please refer to OB /GYN note for full description  EXTREMITIES: No pedal  edema  CNS: Awake and Alert      LABS:                        11.6   9.14  )-----------( 338      ( 16 Oct 2022 06:50 )             36.0                           11.7   12.59 )-----------( 292      ( 15 Oct 2022 05:15 )             35.9         10-16    142  |  110<H>  |  8   ----------------------------<  96  3.4<L>   |  26  |  0.69    Ca    9.1      16 Oct 2022 06:50  Phos  2.7     10-16  Mg     2.1     10-16    TPro  7.0  /  Alb  2.8<L>  /  TBili  0.4  /  DBili  x   /  AST  13  /  ALT  11  /  AlkPhos  83  10-16    10-15    142  |  107  |  15  ----------------------------<  97  3.7   |  26  |  0.83    Ca    9.4      15 Oct 2022 05:15  Phos  2.7     10-15  Mg     1.9     10-15    TPro  6.8  /  Alb  3.0<L>  /  TBili  0.8  /  DBili  x   /  AST  9<L>  /  ALT  11  /  AlkPhos  80  10-15    PT/INR - ( 14 Oct 2022 16:45 )   PT: 13.0 sec;   INR: 1.09 ratio      PTT - ( 14 Oct 2022 16:45 )  PTT:28.5 sec        Urinalysis Basic - ( 14 Oct 2022 16:45 )  Color: Deirdre / Appearance: Clear / S.025 / pH: x  Gluc: x / Ketone: Trace  / Bili: Negative / Urobili: Negative   Blood: x / Protein: 30 mg/dL / Nitrite: Negative   Leuk Esterase: Trace / RBC: 0-2 /HPF / WBC 3-5 /HPF   Sq Epi: x / Non Sq Epi: Occasional /HPF / Bacteria: Few /HPF        MEDICATIONS  (STANDING):    ampicillin/sulbactam  IVPB      ampicillin/sulbactam  IVPB 3 Gram(s) IV Intermittent every 6 hours  enoxaparin Injectable 40 milliGRAM(s) SubCutaneous every 24 hours  sodium chloride 0.9%. 1000 milliLiter(s) (100 mL/Hr) IV Continuous <Continuous>      RADIOLOGY & ADDITIONAL TESTS:    Culture - Blood (10.14.22 @ 16:50)   Specimen Source: .Blood Blood-Peripheral   Culture Results: No growth to date.     Culture - Urine (10.14.22 @ 16:45)   Specimen Source: Clean Catch Clean Catch (Midstream)   Culture Results:  No growth     COVID-19 PCR (10.14.22 @ 16:45)   COVID-19 PCR: NotDetec:              Patient is seen and examined at the bed side, is afebrile. She mentioned feeling better, the swelling and pain of vulva is improving. The Leukocytosis trended down to normal.       REVIEW OF SYSTEMS: All other review systems are negative      ALLERGIES: No Known Allergies      Vital Signs Last 24 Hrs  T(C): 36.9 (16 Oct 2022 12:13), Max: 37.3 (15 Oct 2022 16:41)  T(F): 98.4 (16 Oct 2022 12:13), Max: 99.1 (15 Oct 2022 16:41)  HR: 97 (16 Oct 2022 12:13) (91 - 98)  BP: 115/64 (16 Oct 2022 12:13) (115/64 - 132/67)  BP(mean): --  RR: 17 (16 Oct 2022 12:13) (17 - 17)  SpO2: 98% (16 Oct 2022 12:13) (97% - 98%)    Parameters below as of 16 Oct 2022 12:13  Patient On (Oxygen Delivery Method): room air        PHYSICAL EXAM:  GENERAL: Not in distress   CHEST/LUNG: Not using accessory muscles   HEART: s1 and s2 present  ABDOMEN:  Nontender and  Nondistended  : Left vulva redness, swelling and pain is improving ,Please refer to OB /GYN note for full description  EXTREMITIES: No pedal  edema  CNS: Awake and Alert      LABS:                        11.6   9.14  )-----------( 338      ( 16 Oct 2022 06:50 )             36.0                           11.7   12.59 )-----------( 292      ( 15 Oct 2022 05:15 )             35.9         10-    142  |  110<H>  |  8   ----------------------------<  96  3.4<L>   |  26  |  0.69    Ca    9.1      16 Oct 2022 06:50  Phos  2.7     10-16  Mg     2.1     10-    TPro  7.0  /  Alb  2.8<L>  /  TBili  0.4  /  DBili  x   /  AST  13  /  ALT  11  /  AlkPhos  83  10-16    10-15    142  |  107  |  15  ----------------------------<  97  3.7   |  26  |  0.83    Ca    9.4      15 Oct 2022 05:15  Phos  2.7     10-15  Mg     1.9     10-15    TPro  6.8  /  Alb  3.0<L>  /  TBili  0.8  /  DBili  x   /  AST  9<L>  /  ALT  11  /  AlkPhos  80  10-15    PT/INR - ( 14 Oct 2022 16:45 )   PT: 13.0 sec;   INR: 1.09 ratio      PTT - ( 14 Oct 2022 16:45 )  PTT:28.5 sec        Urinalysis Basic - ( 14 Oct 2022 16:45 )  Color: Deirdre / Appearance: Clear / S.025 / pH: x  Gluc: x / Ketone: Trace  / Bili: Negative / Urobili: Negative   Blood: x / Protein: 30 mg/dL / Nitrite: Negative   Leuk Esterase: Trace / RBC: 0-2 /HPF / WBC 3-5 /HPF   Sq Epi: x / Non Sq Epi: Occasional /HPF / Bacteria: Few /HPF        MEDICATIONS  (STANDING):    ampicillin/sulbactam  IVPB      ampicillin/sulbactam  IVPB 3 Gram(s) IV Intermittent every 6 hours  enoxaparin Injectable 40 milliGRAM(s) SubCutaneous every 24 hours  sodium chloride 0.9%. 1000 milliLiter(s) (100 mL/Hr) IV Continuous <Continuous>      RADIOLOGY & ADDITIONAL TESTS:    Culture - Blood (10.14.22 @ 16:50)   Specimen Source: .Blood Blood-Peripheral   Culture Results: No growth to date.     Culture - Urine (10.14.22 @ 16:45)   Specimen Source: Clean Catch Clean Catch (Midstream)   Culture Results:  No growth     COVID-19 PCR (10.14.22 @ 16:45)   COVID-19 PCR: NotDetec:

## 2022-10-16 NOTE — PROGRESS NOTE ADULT - SUBJECTIVE AND OBJECTIVE BOX
Patient is a 72y old  Female who presents with a chief complaint of Vulvar pain (15 Oct 2022 13:59)    pt seen in ed [ x ], reg med floor [   ], bed [ x ], chair at bedside [   ], a+o x3 [ x ], lethargic [  ],  nad [ x ]    pt states feeling better        Allergies    No Known Allergies        Vitals    T(F): 98 (10-16-22 @ 08:19), Max: 99.1 (10-15-22 @ 16:41)  HR: 97 (10-16-22 @ 08:19) (91 - 100)  BP: 119/70 (10-16-22 @ 08:19) (119/70 - 132/67)  RR: 17 (10-16-22 @ 08:19) (17 - 17)  SpO2: 97% (10-16-22 @ 08:19) (97% - 98%)  Wt(kg): --  CAPILLARY BLOOD GLUCOSE          Labs                          11.6   9.14  )-----------( 338      ( 16 Oct 2022 06:50 )             36.0       10-16    142  |  110<H>  |  8   ----------------------------<  96  3.4<L>   |  26  |  0.69    Ca    9.1      16 Oct 2022 06:50  Phos  2.7     10-16  Mg     2.1     10-16    TPro  7.0  /  Alb  2.8<L>  /  TBili  0.4  /  DBili  x   /  AST  13  /  ALT  11  /  AlkPhos  83  10-16            .Blood Blood-Peripheral  10-14 @ 16:50   No growth to date.  --  --      Clean Catch Clean Catch (Midstream)  10-14 @ 16:45   No growth  --  --      .Blood Blood-Peripheral  10-14 @ 16:40   No growth to date.  --  --          Radiology Results      Meds    MEDICATIONS  (STANDING):  ampicillin/sulbactam  IVPB      ampicillin/sulbactam  IVPB 3 Gram(s) IV Intermittent every 6 hours  enoxaparin Injectable 40 milliGRAM(s) SubCutaneous every 24 hours  sodium chloride 0.9%. 1000 milliLiter(s) (100 mL/Hr) IV Continuous <Continuous>      MEDICATIONS  (PRN):  acetaminophen     Tablet .. 650 milliGRAM(s) Oral every 6 hours PRN Temp greater or equal to 38C (100.4F), Mild Pain (1 - 3)      Physical Exam    Neuro :  no focal deficits  Respiratory: CTA B/L  CV: RRR, S1S2, no murmurs,   Abdominal: Soft, NT, ND +BS,   genitourinary : left vulvitis noted with surrounding erythema and warmth extending to left buttock improved, left vulva hard with induration without area of fluctuance improved  Extremities: No edema, + peripheral pulses    ASSESSMENT      sepsis,   left vulvitis with cellulitis,   h/o HTN (hypertension)      PLAN    blood and ucx neg noted above  cont unasyn   leukocytosis resolved  pt afebrile   id f/u    gyn f/u   left vulvitis; stable  not able to santy at this time   cont current meds   d/c plan if cleared by id for po abx

## 2022-10-17 DIAGNOSIS — I10 ESSENTIAL (PRIMARY) HYPERTENSION: ICD-10-CM

## 2022-10-17 DIAGNOSIS — Z02.9 ENCOUNTER FOR ADMINISTRATIVE EXAMINATIONS, UNSPECIFIED: ICD-10-CM

## 2022-10-17 LAB
ALBUMIN SERPL ELPH-MCNC: 3 G/DL — LOW (ref 3.5–5)
ALP SERPL-CCNC: 80 U/L — SIGNIFICANT CHANGE UP (ref 40–120)
ALT FLD-CCNC: 14 U/L DA — SIGNIFICANT CHANGE UP (ref 10–60)
ANION GAP SERPL CALC-SCNC: 8 MMOL/L — SIGNIFICANT CHANGE UP (ref 5–17)
AST SERPL-CCNC: 12 U/L — SIGNIFICANT CHANGE UP (ref 10–40)
BASOPHILS # BLD AUTO: 0.03 K/UL — SIGNIFICANT CHANGE UP (ref 0–0.2)
BASOPHILS NFR BLD AUTO: 0.4 % — SIGNIFICANT CHANGE UP (ref 0–2)
BILIRUB SERPL-MCNC: 0.3 MG/DL — SIGNIFICANT CHANGE UP (ref 0.2–1.2)
BUN SERPL-MCNC: 6 MG/DL — LOW (ref 7–18)
CALCIUM SERPL-MCNC: 9.7 MG/DL — SIGNIFICANT CHANGE UP (ref 8.4–10.5)
CHLORIDE SERPL-SCNC: 105 MMOL/L — SIGNIFICANT CHANGE UP (ref 96–108)
CO2 SERPL-SCNC: 30 MMOL/L — SIGNIFICANT CHANGE UP (ref 22–31)
CREAT SERPL-MCNC: 0.74 MG/DL — SIGNIFICANT CHANGE UP (ref 0.5–1.3)
EGFR: 86 ML/MIN/1.73M2 — SIGNIFICANT CHANGE UP
EOSINOPHIL # BLD AUTO: 0.38 K/UL — SIGNIFICANT CHANGE UP (ref 0–0.5)
EOSINOPHIL NFR BLD AUTO: 5.6 % — SIGNIFICANT CHANGE UP (ref 0–6)
GLUCOSE SERPL-MCNC: 104 MG/DL — HIGH (ref 70–99)
HCT VFR BLD CALC: 37 % — SIGNIFICANT CHANGE UP (ref 34.5–45)
HGB BLD-MCNC: 12.4 G/DL — SIGNIFICANT CHANGE UP (ref 11.5–15.5)
IMM GRANULOCYTES NFR BLD AUTO: 0.4 % — SIGNIFICANT CHANGE UP (ref 0–0.9)
LYMPHOCYTES # BLD AUTO: 2.19 K/UL — SIGNIFICANT CHANGE UP (ref 1–3.3)
LYMPHOCYTES # BLD AUTO: 32.1 % — SIGNIFICANT CHANGE UP (ref 13–44)
MAGNESIUM SERPL-MCNC: 2 MG/DL — SIGNIFICANT CHANGE UP (ref 1.6–2.6)
MCHC RBC-ENTMCNC: 29.6 PG — SIGNIFICANT CHANGE UP (ref 27–34)
MCHC RBC-ENTMCNC: 33.5 GM/DL — SIGNIFICANT CHANGE UP (ref 32–36)
MCV RBC AUTO: 88.3 FL — SIGNIFICANT CHANGE UP (ref 80–100)
MONOCYTES # BLD AUTO: 0.62 K/UL — SIGNIFICANT CHANGE UP (ref 0–0.9)
MONOCYTES NFR BLD AUTO: 9.1 % — SIGNIFICANT CHANGE UP (ref 2–14)
MRSA PCR RESULT.: SIGNIFICANT CHANGE UP
NEUTROPHILS # BLD AUTO: 3.58 K/UL — SIGNIFICANT CHANGE UP (ref 1.8–7.4)
NEUTROPHILS NFR BLD AUTO: 52.4 % — SIGNIFICANT CHANGE UP (ref 43–77)
NRBC # BLD: 0 /100 WBCS — SIGNIFICANT CHANGE UP (ref 0–0)
PHOSPHATE SERPL-MCNC: 3.4 MG/DL — SIGNIFICANT CHANGE UP (ref 2.5–4.5)
PLATELET # BLD AUTO: 380 K/UL — SIGNIFICANT CHANGE UP (ref 150–400)
POTASSIUM SERPL-MCNC: 3.5 MMOL/L — SIGNIFICANT CHANGE UP (ref 3.5–5.3)
POTASSIUM SERPL-SCNC: 3.5 MMOL/L — SIGNIFICANT CHANGE UP (ref 3.5–5.3)
PROT SERPL-MCNC: 7 G/DL — SIGNIFICANT CHANGE UP (ref 6–8.3)
RBC # BLD: 4.19 M/UL — SIGNIFICANT CHANGE UP (ref 3.8–5.2)
RBC # FLD: 12.5 % — SIGNIFICANT CHANGE UP (ref 10.3–14.5)
S AUREUS DNA NOSE QL NAA+PROBE: SIGNIFICANT CHANGE UP
SODIUM SERPL-SCNC: 143 MMOL/L — SIGNIFICANT CHANGE UP (ref 135–145)
WBC # BLD: 6.83 K/UL — SIGNIFICANT CHANGE UP (ref 3.8–10.5)
WBC # FLD AUTO: 6.83 K/UL — SIGNIFICANT CHANGE UP (ref 3.8–10.5)

## 2022-10-17 PROCEDURE — 99231 SBSQ HOSP IP/OBS SF/LOW 25: CPT

## 2022-10-17 RX ADMIN — AMPICILLIN SODIUM AND SULBACTAM SODIUM 200 GRAM(S): 250; 125 INJECTION, POWDER, FOR SUSPENSION INTRAMUSCULAR; INTRAVENOUS at 19:03

## 2022-10-17 RX ADMIN — ENOXAPARIN SODIUM 40 MILLIGRAM(S): 100 INJECTION SUBCUTANEOUS at 05:59

## 2022-10-17 RX ADMIN — AMPICILLIN SODIUM AND SULBACTAM SODIUM 200 GRAM(S): 250; 125 INJECTION, POWDER, FOR SUSPENSION INTRAMUSCULAR; INTRAVENOUS at 05:59

## 2022-10-17 RX ADMIN — AMPICILLIN SODIUM AND SULBACTAM SODIUM 200 GRAM(S): 250; 125 INJECTION, POWDER, FOR SUSPENSION INTRAMUSCULAR; INTRAVENOUS at 11:45

## 2022-10-17 NOTE — DISCHARGE NOTE PROVIDER - HOSPITAL COURSE
72F visiting from [facility], ambulates independently, with PMHx HTN p/w vulvar pain x3d. Patient states that vulvar pain first began as pimple, which became itchy, and later bigger and more painful. She also endorses fever with chills. She denies any active drainage from the site, but states that the pimple has been getting harder. She denies any associated urinary symptoms. She denies any recent illness or sick contact. No reported prior history of the vaginal/ Vulvar problems. No reported h/o DM. Patient denies HA, chest pain, SOB, cough, abdominal pain, or changes in BM.

## 2022-10-17 NOTE — PROGRESS NOTE ADULT - SUBJECTIVE AND OBJECTIVE BOX
Patient is a 72y old  Female who presents with a chief complaint of Vulvar pain (17 Oct 2022 16:16)      INTERVAL HPI/OVERNIGHT EVENTS: no new complaints    MEDICATIONS  (STANDING):  ampicillin/sulbactam  IVPB      ampicillin/sulbactam  IVPB 3 Gram(s) IV Intermittent every 6 hours  enoxaparin Injectable 40 milliGRAM(s) SubCutaneous every 24 hours  sodium chloride 0.9%. 1000 milliLiter(s) (100 mL/Hr) IV Continuous <Continuous>    MEDICATIONS  (PRN):  acetaminophen     Tablet .. 650 milliGRAM(s) Oral every 6 hours PRN Temp greater or equal to 38C (100.4F), Mild Pain (1 - 3)  oxycodone    5 mG/acetaminophen 325 mG 1 Tablet(s) Oral every 6 hours PRN Moderate Pain (4 - 6)      __________________________________________________  REVIEW OF SYSTEMS:    CONSTITUTIONAL: No fever, chills  EYES: no acute visual disturbances  NECK: No pain or stiffness  RESPIRATORY: No cough; No shortness of breath  CARDIOVASCULAR: No chest pain, no palpitations  GASTROINTESTINAL: No pain. No nausea or vomiting; No diarrhea   NEUROLOGICAL: No headache or numbness, no tremors  MUSCULOSKELETAL: No joint pain, no muscle pain  GENITOURINARY: no dysuria, no frequency, no hesitancy  PSYCHIATRY: no depression , no anxiety  ALL OTHER  ROS negative        Vital Signs Last 24 Hrs  T(C): 36.3 (17 Oct 2022 12:32), Max: 37.2 (16 Oct 2022 19:09)  T(F): 97.3 (17 Oct 2022 12:32), Max: 98.9 (16 Oct 2022 19:09)  HR: 86 (17 Oct 2022 12:32) (86 - 107)  BP: 142/71 (17 Oct 2022 12:32) (120/70 - 149/82)  BP(mean): --  RR: 18 (17 Oct 2022 12:32) (18 - 18)  SpO2: 99% (17 Oct 2022 12:32) (97% - 100%)    Parameters below as of 17 Oct 2022 12:32  Patient On (Oxygen Delivery Method): room air        ________________________________________________  PHYSICAL EXAM:  GENERAL: No act  HEENT: Normocephalic;  conjunctivae and sclerae clear; moist mucous membranes;   NECK : supple  CHEST/LUNG: Clear to auscultation bilaterally with good air entry   HEART: S1 S2  regular; no murmurs, gallops or rubs  ABDOMEN: Soft, Nontender, Nondistended; Bowel sounds present  EXTREMITIES: no cyanosis; no edema; no calf tenderness  SKIN: warm and dry; no rash  NERVOUS SYSTEM:  Awake and alert; Oriented  to place, person and time ; no new deficits    _________________________________________________  LABS:                        12.4   6.83  )-----------( 380      ( 17 Oct 2022 06:11 )             37.0     10-17    143  |  105  |  6<L>  ----------------------------<  104<H>  3.5   |  30  |  0.74    Ca    9.7      17 Oct 2022 06:11  Phos  3.4     10-17  Mg     2.0     10-17    TPro  7.0  /  Alb  3.0<L>  /  TBili  0.3  /  DBili  x   /  AST  12  /  ALT  14  /  AlkPhos  80  10-17        CAPILLARY BLOOD GLUCOSE            RADIOLOGY & ADDITIONAL TESTS:    Imaging  Reviewed:  YES/NO    Consultant(s) Notes Reviewed:   YES/ No      Plan of care was discussed with patient and /or primary care giver; all questions and concerns were addressed      Patient is a 72y old  Female who presents with a chief complaint of Vulvar pain (17 Oct 2022 16:16)      INTERVAL HPI/OVERNIGHT EVENTS: no new complaints  Patient seen with  Edward  # [id number]    MEDICATIONS  (STANDING):  ampicillin/sulbactam  IVPB      ampicillin/sulbactam  IVPB 3 Gram(s) IV Intermittent every 6 hours  enoxaparin Injectable 40 milliGRAM(s) SubCutaneous every 24 hours  sodium chloride 0.9%. 1000 milliLiter(s) (100 mL/Hr) IV Continuous <Continuous>    MEDICATIONS  (PRN):  acetaminophen     Tablet .. 650 milliGRAM(s) Oral every 6 hours PRN Temp greater or equal to 38C (100.4F), Mild Pain (1 - 3)  oxycodone    5 mG/acetaminophen 325 mG 1 Tablet(s) Oral every 6 hours PRN Moderate Pain (4 - 6)      __________________________________________________  REVIEW OF SYSTEMS:    CONSTITUTIONAL: No fever, chills  EYES: no acute visual disturbances  NECK: No pain or stiffness  RESPIRATORY: No cough; No shortness of breath  CARDIOVASCULAR: No chest pain, no palpitations  GASTROINTESTINAL: No pain. No nausea or vomiting; No diarrhea   NEUROLOGICAL: No headache or numbness, no tremors  MUSCULOSKELETAL: No joint pain, no muscle pain  GENITOURINARY: no dysuria, no frequency, no hesitancy.  mild discomfort to vulva.  PSYCHIATRY: no depression , no anxiety  ALL OTHER  ROS negative        Vital Signs Last 24 Hrs  T(C): 36.3 (17 Oct 2022 12:32), Max: 37.2 (16 Oct 2022 19:09)  T(F): 97.3 (17 Oct 2022 12:32), Max: 98.9 (16 Oct 2022 19:09)  HR: 86 (17 Oct 2022 12:32) (86 - 107)  BP: 142/71 (17 Oct 2022 12:32) (120/70 - 149/82)  BP(mean): --  RR: 18 (17 Oct 2022 12:32) (18 - 18)  SpO2: 99% (17 Oct 2022 12:32) (97% - 100%)    Parameters below as of 17 Oct 2022 12:32  Patient On (Oxygen Delivery Method): room air      ________________________________________________    PHYSICAL EXAM:  GENERAL: No acute distress  HEENT: Normocephalic;  conjunctivae and sclerae clear; moist mucous membranes;   NECK : supple  CHEST/LUNG: Clear to auscultation bilaterally with good air entry   HEART: S1 S2  regular; no murmurs, gallops or rubs  ABDOMEN: Soft, Nontender, Nondistended; Bowel sounds present  EXTREMITIES: no cyanosis; no edema; no calf tenderness  SKIN: warm and dry; no rash  NERVOUS SYSTEM:  Awake and alert; Oriented  to place, person and time ; no new deficits    _________________________________________________      LABS:                        12.4   6.83  )-----------( 380      ( 17 Oct 2022 06:11 )             37.0     10-17    143  |  105  |  6<L>  ----------------------------<  104<H>  3.5   |  30  |  0.74    Ca    9.7      17 Oct 2022 06:11  Phos  3.4     10-17  Mg     2.0     10-17    TPro  7.0  /  Alb  3.0<L>  /  TBili  0.3  /  DBili  x   /  AST  12  /  ALT  14  /  AlkPhos  80  10-17        RADIOLOGY & ADDITIONAL TESTS:    Imaging  Reviewed:  YES/NO    Consultant(s) Notes Reviewed:   YES/ No      Plan of care was discussed with patient and /or primary care giver; all questions and concerns were addressed

## 2022-10-17 NOTE — DISCHARGE NOTE PROVIDER - NSDCCPCAREPLAN_GEN_ALL_CORE_FT
PRINCIPAL DISCHARGE DIAGNOSIS  Diagnosis: Sepsis due to cellulitis  Assessment and Plan of Treatment: Cellulitis is a skin infection caused by bacteria. Cellulitis is common and can become severe. Cellulitis usually appears on the lower legs. It can also appear on the arms, face, and other areas. Cellulitis develops when bacteria enter a crack or break in your skin.   You were admitted with cellulitis on your vulva.  You were treated with IV antibiotics and is now stable for discharge with oral antibiotics.  You should complete this antibiotic as prescribed, even if  you feel better.      SECONDARY DISCHARGE DIAGNOSES  Diagnosis: HTN (hypertension)  Assessment and Plan of Treatment: You have a history of Hypertension.   Hypertension is high blood pressure. Your blood pressure is the force of your blood moving against the walls of your arteries. Hypertension causes your blood pressure to get so high that your heart has to work much harder than normal. This can damage your heart. The cause of hypertension may not be known. This is called essential or primary hypertension. Hypertension caused by another medical condition, such as kidney disease, is called secondary hypertension.  You should continue to take you anti hypertensives as prescribed.   You should return to the Emergency deparrtment if you experience any severe headaches, vision loss or weakness in the arms or legs.  You should follow up with your primary doctor to have you blood pressur checked routinely.  You should follow a low salt diet.      Diagnosis: HTN (hypertension)  Assessment and Plan of Treatment: You have a history of Hypertension.   Hypertension is high blood pressure. Your blood pressure is the force of your blood moving against the walls of your arteries. Hypertension causes your blood pressure to get so high that your heart has to work much harder than normal. This can damage your heart. The cause of hypertension may not be known. This is called essential or primary hypertension. Hypertension caused by another medical condition, such as kidney disease, is called secondary hypertension.  You should continue to take you anti hypertensives as prescribed.   You should return to the Emergency deparrtment if you experience any severe headaches, vision loss or weakness in the arms or legs.  You should follow up with your primary doctor to have you blood pressur checked routinely.  You should follow a low salt diet.       no

## 2022-10-17 NOTE — PROGRESS NOTE ADULT - ASSESSMENT
72F visiting from [facility], ambulates independently, with PMHx HTN p/w vulvar pain x3d. Patient states that vulvar pain first began as pimple, which became itchy, and later bigger and more painful. She also endorses fever with chills. She denies any active drainage from the site, but states that the pimple has been getting harder. She denies any associated urinary symptoms. She denies any recent illness or sick contact. No reported prior history of the vaginal/ Vulvar problems. No reported h/o DM. Patient denies HA, chest pain, SOB, cough, abdominal pain, or changes in BM.   Started on IV antibiotic  Seen by GYN  -  no intervention at this time

## 2022-10-17 NOTE — PROGRESS NOTE ADULT - SUBJECTIVE AND OBJECTIVE BOX
Patient is a 72y old  Female who presents with a chief complaint of Vulvar pain (16 Oct 2022 15:04)    pt seen in ed [ x ], reg med floor [   ], bed [ x ], chair at bedside [   ], a+o x3 [ x ], lethargic [  ],  nad [ x ]    Allergies    No Known Allergies        Vitals    T(F): 97.9 (10-17-22 @ 05:30), Max: 98.9 (10-16-22 @ 19:09)  HR: 86 (10-17-22 @ 05:30) (85 - 107)  BP: 146/71 (10-17-22 @ 05:30) (115/64 - 149/82)  RR: 18 (10-17-22 @ 05:30) (17 - 18)  SpO2: 97% (10-17-22 @ 05:30) (97% - 100%)  Wt(kg): --  CAPILLARY BLOOD GLUCOSE          Labs                          11.6   9.14  )-----------( 338      ( 16 Oct 2022 06:50 )             36.0       10-16    142  |  110<H>  |  8   ----------------------------<  96  3.4<L>   |  26  |  0.69    Ca    9.1      16 Oct 2022 06:50  Phos  2.7     10-16  Mg     2.1     10-16    TPro  7.0  /  Alb  2.8<L>  /  TBili  0.4  /  DBili  x   /  AST  13  /  ALT  11  /  AlkPhos  83  10-16            .Blood Blood-Peripheral  10-14 @ 16:50   No growth to date.  --  --      Clean Catch Clean Catch (Midstream)  10-14 @ 16:45   No growth  --  --      .Blood Blood-Peripheral  10-14 @ 16:40   No growth to date.  --  --          Radiology Results      Meds    MEDICATIONS  (STANDING):  ampicillin/sulbactam  IVPB      ampicillin/sulbactam  IVPB 3 Gram(s) IV Intermittent every 6 hours  enoxaparin Injectable 40 milliGRAM(s) SubCutaneous every 24 hours  sodium chloride 0.9%. 1000 milliLiter(s) (100 mL/Hr) IV Continuous <Continuous>      MEDICATIONS  (PRN):  acetaminophen     Tablet .. 650 milliGRAM(s) Oral every 6 hours PRN Temp greater or equal to 38C (100.4F), Mild Pain (1 - 3)  oxycodone    5 mG/acetaminophen 325 mG 1 Tablet(s) Oral every 6 hours PRN Moderate Pain (4 - 6)      Physical Exam    Neuro :  no focal deficits  Respiratory: CTA B/L  CV: RRR, S1S2, no murmurs,   Abdominal: Soft, NT, ND +BS,   genitourinary : left vulvitis noted with surrounding erythema and warmth extending to left buttock improved, left vulva hard with induration without area of fluctuance improved  Extremities: No edema, + peripheral pulses    ASSESSMENT      sepsis,   left vulvitis with cellulitis,   h/o HTN (hypertension)      PLAN    blood and ucx neg noted above  cont unasyn   leukocytosis resolved  pt afebrile   id f/u    gyn f/u   left vulvitis; stable  not able to santy at this time   cont current meds   d/c plan if cleared by id for po abx

## 2022-10-17 NOTE — PROGRESS NOTE ADULT - PROBLEM SELECTOR PLAN 1
-  Admitted with painful area on Vulva  -  Started on Unasyn by ID  -  No surgical or GYN intervention at this time  -  Plan to discharge on PO Augmentin

## 2022-10-17 NOTE — PROGRESS NOTE ADULT - PROBLEM SELECTOR PLAN 2
-  Stable  -  SBP 120s to 140s  -  Not currently on any medications  -  monitor BP routinely  -  DASH /  TLC diet

## 2022-10-17 NOTE — PROGRESS NOTE ADULT - PROBLEM SELECTOR PLAN 3
-  Plan to discharge home 10/17 with [facility]  -  Patient to be discharged to family,  currently visiting from [place]  -  Plan to discharge with Augmentin -   Sent to [facility] today.  -  SW following.

## 2022-10-17 NOTE — DISCHARGE NOTE PROVIDER - NSFOLLOWUPCLINICS_GEN_ALL_ED_FT
[facility]  [address]  [address] [place] [address]  Phone: [phone]  Fax: [phone]  Scheduled Appointment: 10/21/2022

## 2022-10-17 NOTE — PROGRESS NOTE ADULT - SUBJECTIVE AND OBJECTIVE BOX
72y  Patient seen at bedisde resting comfortably and states pain is improving and it began draining yesterday. . + Ambulation, voiding without difficulty. Denies fever, chills, n/v. Pt is apply warm water to area daily.     Vital Signs Last 24 Hrs  T(C): 36.3 (17 Oct 2022 12:32), Max: 37.2 (16 Oct 2022 19:09)  T(F): 97.3 (17 Oct 2022 12:32), Max: 98.9 (16 Oct 2022 19:09)  HR: 86 (17 Oct 2022 12:32) (85 - 107)  BP: 142/71 (17 Oct 2022 12:32) (119/71 - 149/82)  BP(mean): --  RR: 18 (17 Oct 2022 12:32) (17 - 18)  SpO2: 99% (17 Oct 2022 12:32) (97% - 100%)    Parameters below as of 17 Oct 2022 12:32  Patient On (Oxygen Delivery Method): room air        Gen: A&O x 3, NAD  Gyn: no vaginal bleeding, left labia erythematous and still firm  extending to area of left buttock. There is a pinpoint area of purulent drainage in the inferior aspect of labia.                             12.4   6.83  )-----------( 380      ( 17 Oct 2022 06:11 )             37.0     10-17    143  |  105  |  6<L>  ----------------------------<  104<H>  3.5   |  30  |  0.74    Ca    9.7      17 Oct 2022 06:11  Phos  3.4     10-17  Mg     2.0     10-17    TPro  7.0  /  Alb  3.0<L>  /  TBili  0.3  /  DBili  x   /  AST  12  /  ALT  14  /  AlkPhos  80  10-17      A/P: HD# 3 left vulvitis extending to buttock   -cont Unasyn convert to po antibiotics per ID  - pain management prn  -follow up as outpatient by friday at gyn clinic or medicine if discharged today.    - Patient to continue warm compress to area 3x/day   -d/w LUIS ENRIQUE Hardwick

## 2022-10-17 NOTE — DISCHARGE NOTE PROVIDER - NSDCMRMEDTOKEN_GEN_ALL_CORE_FT
amoxicillin-clavulanate 875 mg-125 mg oral tablet: 1 milligram(s) orally 2 times a day    acetaminophen 325 mg oral tablet: 2 tab(s) orally every 6 hours, As needed, Temp greater or equal to 38C (100.4F), Mild Pain (1 - 3)  amoxicillin-clavulanate 875 mg-125 mg oral tablet: 1 milligram(s) orally 2 times a day

## 2022-10-18 VITALS
RESPIRATION RATE: 18 BRPM | OXYGEN SATURATION: 97 % | SYSTOLIC BLOOD PRESSURE: 148 MMHG | DIASTOLIC BLOOD PRESSURE: 77 MMHG | HEART RATE: 80 BPM | TEMPERATURE: 97 F

## 2022-10-18 PROCEDURE — 96361 HYDRATE IV INFUSION ADD-ON: CPT

## 2022-10-18 PROCEDURE — 71045 X-RAY EXAM CHEST 1 VIEW: CPT

## 2022-10-18 PROCEDURE — 83605 ASSAY OF LACTIC ACID: CPT

## 2022-10-18 PROCEDURE — 85730 THROMBOPLASTIN TIME PARTIAL: CPT

## 2022-10-18 PROCEDURE — 99285 EMERGENCY DEPT VISIT HI MDM: CPT

## 2022-10-18 PROCEDURE — 87641 MR-STAPH DNA AMP PROBE: CPT

## 2022-10-18 PROCEDURE — 87640 STAPH A DNA AMP PROBE: CPT

## 2022-10-18 PROCEDURE — 36415 COLL VENOUS BLD VENIPUNCTURE: CPT

## 2022-10-18 PROCEDURE — 83735 ASSAY OF MAGNESIUM: CPT

## 2022-10-18 PROCEDURE — 87635 SARS-COV-2 COVID-19 AMP PRB: CPT

## 2022-10-18 PROCEDURE — 93005 ELECTROCARDIOGRAM TRACING: CPT

## 2022-10-18 PROCEDURE — 85610 PROTHROMBIN TIME: CPT

## 2022-10-18 PROCEDURE — 74176 CT ABD & PELVIS W/O CONTRAST: CPT

## 2022-10-18 PROCEDURE — 85025 COMPLETE CBC W/AUTO DIFF WBC: CPT

## 2022-10-18 PROCEDURE — 96365 THER/PROPH/DIAG IV INF INIT: CPT

## 2022-10-18 PROCEDURE — 86803 HEPATITIS C AB TEST: CPT

## 2022-10-18 PROCEDURE — 84100 ASSAY OF PHOSPHORUS: CPT

## 2022-10-18 PROCEDURE — 81001 URINALYSIS AUTO W/SCOPE: CPT

## 2022-10-18 PROCEDURE — 87040 BLOOD CULTURE FOR BACTERIA: CPT

## 2022-10-18 PROCEDURE — 87086 URINE CULTURE/COLONY COUNT: CPT

## 2022-10-18 PROCEDURE — 80053 COMPREHEN METABOLIC PANEL: CPT

## 2022-10-18 RX ORDER — ACETAMINOPHEN 500 MG
2 TABLET ORAL
Qty: 0 | Refills: 0 | DISCHARGE
Start: 2022-10-18

## 2022-10-18 RX ADMIN — AMPICILLIN SODIUM AND SULBACTAM SODIUM 200 GRAM(S): 250; 125 INJECTION, POWDER, FOR SUSPENSION INTRAMUSCULAR; INTRAVENOUS at 06:07

## 2022-10-18 RX ADMIN — AMPICILLIN SODIUM AND SULBACTAM SODIUM 200 GRAM(S): 250; 125 INJECTION, POWDER, FOR SUSPENSION INTRAMUSCULAR; INTRAVENOUS at 00:03

## 2022-10-18 RX ADMIN — AMPICILLIN SODIUM AND SULBACTAM SODIUM 200 GRAM(S): 250; 125 INJECTION, POWDER, FOR SUSPENSION INTRAMUSCULAR; INTRAVENOUS at 12:00

## 2022-10-18 RX ADMIN — ENOXAPARIN SODIUM 40 MILLIGRAM(S): 100 INJECTION SUBCUTANEOUS at 06:07

## 2022-10-18 NOTE — DISCHARGE NOTE NURSING/CASE MANAGEMENT/SOCIAL WORK - PATIENT PORTAL LINK FT
You can access the FollowMyHealth Patient Portal offered by [facility] by registering at the following website: [email or web address]. By joining [facility]’s FollowMyHealth portal, you will also be able to view your health information using other applications (apps) compatible with our system.

## 2022-10-18 NOTE — PROGRESS NOTE ADULT - PROVIDER SPECIALTY LIST ADULT
GYN
Infectious Disease
Internal Medicine
Internal Medicine
Infectious Disease
Internal Medicine

## 2022-10-18 NOTE — PROGRESS NOTE ADULT - SUBJECTIVE AND OBJECTIVE BOX
Patient is a 72y old  Female who presents with a chief complaint of Vulvar pain (17 Oct 2022 16:16)      pt seen in ed [ x ], reg med floor [   ], bed [ x ], chair at bedside [   ], a+o x3 [ x ], lethargic [  ],    nad [ x ]        Allergies    No Known Allergies        Vitals    T(F): 97.7 (10-18-22 @ 04:45), Max: 97.8 (10-17-22 @ 21:06)  HR: 85 (10-18-22 @ 04:45) (85 - 86)  BP: 151/73 (10-18-22 @ 04:45) (134/71 - 151/73)  RR: 18 (10-18-22 @ 04:45) (16 - 18)  SpO2: 97% (10-18-22 @ 04:45) (97% - 99%)  Wt(kg): --  CAPILLARY BLOOD GLUCOSE          Labs                          12.4   6.83  )-----------( 380      ( 17 Oct 2022 06:11 )             37.0       10-17    143  |  105  |  6<L>  ----------------------------<  104<H>  3.5   |  30  |  0.74    Ca    9.7      17 Oct 2022 06:11  Phos  3.4     10-17  Mg     2.0     10-17    TPro  7.0  /  Alb  3.0<L>  /  TBili  0.3  /  DBili  x   /  AST  12  /  ALT  14  /  AlkPhos  80  10-17            .Blood Blood-Peripheral  10-14 @ 16:50   No growth to date.  --  --      Clean Catch Clean Catch (Midstream)  10-14 @ 16:45   No growth  --  --      .Blood Blood-Peripheral  10-14 @ 16:40   No growth to date.  --  --          Radiology Results      Meds    MEDICATIONS  (STANDING):  ampicillin/sulbactam  IVPB      ampicillin/sulbactam  IVPB 3 Gram(s) IV Intermittent every 6 hours  enoxaparin Injectable 40 milliGRAM(s) SubCutaneous every 24 hours  sodium chloride 0.9%. 1000 milliLiter(s) (100 mL/Hr) IV Continuous <Continuous>      MEDICATIONS  (PRN):  acetaminophen     Tablet .. 650 milliGRAM(s) Oral every 6 hours PRN Temp greater or equal to 38C (100.4F), Mild Pain (1 - 3)  oxycodone    5 mG/acetaminophen 325 mG 1 Tablet(s) Oral every 6 hours PRN Moderate Pain (4 - 6)      Physical Exam    Neuro :  no focal deficits  Respiratory: CTA B/L  CV: RRR, S1S2, no murmurs,   Abdominal: Soft, NT, ND +BS,   genitourinary : left vulvitis noted with surrounding erythema and warmth extending to left buttock improved, left vulva hard with induration without area of fluctuance improved  Extremities: No edema, + peripheral pulses    ASSESSMENT      sepsis,   left vulvitis with cellulitis,   h/o HTN (hypertension)      PLAN    blood and ucx neg noted above  cont unasyn   leukocytosis resolved  pt afebrile   id f/u    gyn f/u   left vulvitis; stable  not able to santy at this time   cont current meds   d/c plan if cleared by id for po abx    from chart note event  d/w id   change abx to augmentin 875 mg bid x 7 days   pt stable for d/c  f/u gyn outpt. Patient is a 72y old  Female who presents with a chief complaint of Vulvar pain (17 Oct 2022 16:16)      pt seen in ed [  ], reg med floor [ x  ], bed [ x ], chair at bedside [   ], a+o x3 [ x ], lethargic [  ],    nad [ x ]        Allergies    No Known Allergies        Vitals    T(F): 97.7 (10-18-22 @ 04:45), Max: 97.8 (10-17-22 @ 21:06)  HR: 85 (10-18-22 @ 04:45) (85 - 86)  BP: 151/73 (10-18-22 @ 04:45) (134/71 - 151/73)  RR: 18 (10-18-22 @ 04:45) (16 - 18)  SpO2: 97% (10-18-22 @ 04:45) (97% - 99%)  Wt(kg): --  CAPILLARY BLOOD GLUCOSE          Labs                          12.4   6.83  )-----------( 380      ( 17 Oct 2022 06:11 )             37.0       10-17    143  |  105  |  6<L>  ----------------------------<  104<H>  3.5   |  30  |  0.74    Ca    9.7      17 Oct 2022 06:11  Phos  3.4     10-17  Mg     2.0     10-17    TPro  7.0  /  Alb  3.0<L>  /  TBili  0.3  /  DBili  x   /  AST  12  /  ALT  14  /  AlkPhos  80  10-17            .Blood Blood-Peripheral  10-14 @ 16:50   No growth to date.  --  --      Clean Catch Clean Catch (Midstream)  10-14 @ 16:45   No growth  --  --      .Blood Blood-Peripheral  10-14 @ 16:40   No growth to date.  --  --          Radiology Results      Meds    MEDICATIONS  (STANDING):  ampicillin/sulbactam  IVPB      ampicillin/sulbactam  IVPB 3 Gram(s) IV Intermittent every 6 hours  enoxaparin Injectable 40 milliGRAM(s) SubCutaneous every 24 hours  sodium chloride 0.9%. 1000 milliLiter(s) (100 mL/Hr) IV Continuous <Continuous>      MEDICATIONS  (PRN):  acetaminophen     Tablet .. 650 milliGRAM(s) Oral every 6 hours PRN Temp greater or equal to 38C (100.4F), Mild Pain (1 - 3)  oxycodone    5 mG/acetaminophen 325 mG 1 Tablet(s) Oral every 6 hours PRN Moderate Pain (4 - 6)      Physical Exam    Neuro :  no focal deficits  Respiratory: CTA B/L  CV: RRR, S1S2, no murmurs,   Abdominal: Soft, NT, ND +BS,   genitourinary : left vulvitis noted with surrounding erythema and warmth extending to left buttock improved, left vulva hard with induration without area of fluctuance improved  Extremities: No edema, + peripheral pulses        ASSESSMENT      sepsis,   left vulvitis with cellulitis,   h/o HTN (hypertension)      PLAN    blood and ucx neg noted above  cont unasyn   leukocytosis resolved  pt afebrile   id f/u    d/w id   change abx to augmentin 875 mg bid x 7 days   gyn f/u   left vulvitis; stable  not able to santy at this time   follow up as outpatient by friday at gyn clinic or medicine if discharged today.    Patient to continue warm compress to area 3x/day   cont current meds   pt stable for d/c pending vivo meds

## 2022-10-18 NOTE — DISCHARGE NOTE NURSING/CASE MANAGEMENT/SOCIAL WORK - NSDCPEFALRISK_GEN_ALL_CORE
For information on Fall & Injury Prevention, visit: [email or web address] OR  [email or web address] OR  https://www.cdc.gov/steadi/patient.html

## 2022-10-19 LAB
CULTURE RESULTS: SIGNIFICANT CHANGE UP
CULTURE RESULTS: SIGNIFICANT CHANGE UP
SPECIMEN SOURCE: SIGNIFICANT CHANGE UP
SPECIMEN SOURCE: SIGNIFICANT CHANGE UP

## 2022-12-04 ENCOUNTER — INPATIENT (INPATIENT)
Facility: HOSPITAL | Age: 72
LOS: 3 days | Discharge: HOME | DRG: 641 | End: 2022-12-08
Attending: INTERNAL MEDICINE | Admitting: INTERNAL MEDICINE
Payer: MEDICAID

## 2022-12-04 VITALS
HEART RATE: 101 BPM | DIASTOLIC BLOOD PRESSURE: 82 MMHG | RESPIRATION RATE: 16 BRPM | HEIGHT: 62.2 IN | TEMPERATURE: 98 F | WEIGHT: 169.98 LBS | SYSTOLIC BLOOD PRESSURE: 137 MMHG | OXYGEN SATURATION: 99 %

## 2022-12-04 DIAGNOSIS — Z29.9 ENCOUNTER FOR PROPHYLACTIC MEASURES, UNSPECIFIED: ICD-10-CM

## 2022-12-04 DIAGNOSIS — R42 DIZZINESS AND GIDDINESS: ICD-10-CM

## 2022-12-04 DIAGNOSIS — I10 ESSENTIAL (PRIMARY) HYPERTENSION: ICD-10-CM

## 2022-12-04 LAB
ALBUMIN SERPL ELPH-MCNC: 3.7 G/DL — SIGNIFICANT CHANGE UP (ref 3.5–5)
ALP SERPL-CCNC: 96 U/L — SIGNIFICANT CHANGE UP (ref 40–120)
ALT FLD-CCNC: 18 U/L DA — SIGNIFICANT CHANGE UP (ref 10–60)
ANION GAP SERPL CALC-SCNC: 10 MMOL/L — SIGNIFICANT CHANGE UP (ref 5–17)
APPEARANCE UR: CLEAR — SIGNIFICANT CHANGE UP
APTT BLD: 24.8 SEC — LOW (ref 27.5–35.5)
AST SERPL-CCNC: 10 U/L — SIGNIFICANT CHANGE UP (ref 10–40)
BACTERIA # UR AUTO: ABNORMAL /HPF
BASOPHILS # BLD AUTO: 0.04 K/UL — SIGNIFICANT CHANGE UP (ref 0–0.2)
BASOPHILS NFR BLD AUTO: 0.5 % — SIGNIFICANT CHANGE UP (ref 0–2)
BILIRUB SERPL-MCNC: 0.5 MG/DL — SIGNIFICANT CHANGE UP (ref 0.2–1.2)
BILIRUB UR-MCNC: NEGATIVE — SIGNIFICANT CHANGE UP
BUN SERPL-MCNC: 24 MG/DL — HIGH (ref 7–18)
CALCIUM SERPL-MCNC: 10.2 MG/DL — SIGNIFICANT CHANGE UP (ref 8.4–10.5)
CHLORIDE SERPL-SCNC: 105 MMOL/L — SIGNIFICANT CHANGE UP (ref 96–108)
CO2 SERPL-SCNC: 27 MMOL/L — SIGNIFICANT CHANGE UP (ref 22–31)
COLOR SPEC: YELLOW — SIGNIFICANT CHANGE UP
COMMENT - URINE: SIGNIFICANT CHANGE UP
CREAT SERPL-MCNC: 0.95 MG/DL — SIGNIFICANT CHANGE UP (ref 0.5–1.3)
DIFF PNL FLD: NEGATIVE — SIGNIFICANT CHANGE UP
EGFR: 64 ML/MIN/1.73M2 — SIGNIFICANT CHANGE UP
EOSINOPHIL # BLD AUTO: 0.04 K/UL — SIGNIFICANT CHANGE UP (ref 0–0.5)
EOSINOPHIL NFR BLD AUTO: 0.5 % — SIGNIFICANT CHANGE UP (ref 0–6)
EPI CELLS # UR: ABNORMAL /HPF
GLUCOSE SERPL-MCNC: 173 MG/DL — HIGH (ref 70–99)
GLUCOSE UR QL: NEGATIVE — SIGNIFICANT CHANGE UP
HCT VFR BLD CALC: 42.7 % — SIGNIFICANT CHANGE UP (ref 34.5–45)
HGB BLD-MCNC: 13.8 G/DL — SIGNIFICANT CHANGE UP (ref 11.5–15.5)
IMM GRANULOCYTES NFR BLD AUTO: 0.4 % — SIGNIFICANT CHANGE UP (ref 0–0.9)
INR BLD: 0.94 RATIO — SIGNIFICANT CHANGE UP (ref 0.88–1.16)
KETONES UR-MCNC: NEGATIVE — SIGNIFICANT CHANGE UP
LEUKOCYTE ESTERASE UR-ACNC: NEGATIVE — SIGNIFICANT CHANGE UP
LYMPHOCYTES # BLD AUTO: 2.2 K/UL — SIGNIFICANT CHANGE UP (ref 1–3.3)
LYMPHOCYTES # BLD AUTO: 27.7 % — SIGNIFICANT CHANGE UP (ref 13–44)
MCHC RBC-ENTMCNC: 29.1 PG — SIGNIFICANT CHANGE UP (ref 27–34)
MCHC RBC-ENTMCNC: 32.3 GM/DL — SIGNIFICANT CHANGE UP (ref 32–36)
MCV RBC AUTO: 89.9 FL — SIGNIFICANT CHANGE UP (ref 80–100)
MONOCYTES # BLD AUTO: 0.45 K/UL — SIGNIFICANT CHANGE UP (ref 0–0.9)
MONOCYTES NFR BLD AUTO: 5.7 % — SIGNIFICANT CHANGE UP (ref 2–14)
NEUTROPHILS # BLD AUTO: 5.18 K/UL — SIGNIFICANT CHANGE UP (ref 1.8–7.4)
NEUTROPHILS NFR BLD AUTO: 65.2 % — SIGNIFICANT CHANGE UP (ref 43–77)
NITRITE UR-MCNC: NEGATIVE — SIGNIFICANT CHANGE UP
NRBC # BLD: 0 /100 WBCS — SIGNIFICANT CHANGE UP (ref 0–0)
PH UR: 5 — SIGNIFICANT CHANGE UP (ref 5–8)
PLATELET # BLD AUTO: 371 K/UL — SIGNIFICANT CHANGE UP (ref 150–400)
POTASSIUM SERPL-MCNC: 4.1 MMOL/L — SIGNIFICANT CHANGE UP (ref 3.5–5.3)
POTASSIUM SERPL-SCNC: 4.1 MMOL/L — SIGNIFICANT CHANGE UP (ref 3.5–5.3)
PROT SERPL-MCNC: 7.7 G/DL — SIGNIFICANT CHANGE UP (ref 6–8.3)
PROT UR-MCNC: NEGATIVE — SIGNIFICANT CHANGE UP
PROTHROM AB SERPL-ACNC: 11.2 SEC — SIGNIFICANT CHANGE UP (ref 10.5–13.4)
RBC # BLD: 4.75 M/UL — SIGNIFICANT CHANGE UP (ref 3.8–5.2)
RBC # FLD: 12.9 % — SIGNIFICANT CHANGE UP (ref 10.3–14.5)
RBC CASTS # UR COMP ASSIST: SIGNIFICANT CHANGE UP /HPF (ref 0–2)
SARS-COV-2 RNA SPEC QL NAA+PROBE: SIGNIFICANT CHANGE UP
SODIUM SERPL-SCNC: 142 MMOL/L — SIGNIFICANT CHANGE UP (ref 135–145)
SP GR SPEC: 1.02 — SIGNIFICANT CHANGE UP (ref 1.01–1.02)
TROPONIN I, HIGH SENSITIVITY RESULT: 6.6 NG/L — SIGNIFICANT CHANGE UP
UROBILINOGEN FLD QL: NEGATIVE — SIGNIFICANT CHANGE UP
WBC # BLD: 7.94 K/UL — SIGNIFICANT CHANGE UP (ref 3.8–10.5)
WBC # FLD AUTO: 7.94 K/UL — SIGNIFICANT CHANGE UP (ref 3.8–10.5)
WBC UR QL: SIGNIFICANT CHANGE UP /HPF (ref 0–5)

## 2022-12-04 PROCEDURE — 70498 CT ANGIOGRAPHY NECK: CPT | Mod: 26,MG

## 2022-12-04 PROCEDURE — 99284 EMERGENCY DEPT VISIT MOD MDM: CPT

## 2022-12-04 PROCEDURE — 70496 CT ANGIOGRAPHY HEAD: CPT | Mod: 26,MG

## 2022-12-04 PROCEDURE — G1004: CPT

## 2022-12-04 PROCEDURE — 0042T: CPT | Mod: ME

## 2022-12-04 RX ORDER — MECLIZINE HCL 12.5 MG
25 TABLET ORAL ONCE
Refills: 0 | Status: COMPLETED | OUTPATIENT
Start: 2022-12-04 | End: 2022-12-04

## 2022-12-04 RX ORDER — ENOXAPARIN SODIUM 100 MG/ML
40 INJECTION SUBCUTANEOUS EVERY 24 HOURS
Refills: 0 | Status: DISCONTINUED | OUTPATIENT
Start: 2022-12-04 | End: 2022-12-08

## 2022-12-04 RX ORDER — SODIUM CHLORIDE 9 MG/ML
1000 INJECTION INTRAMUSCULAR; INTRAVENOUS; SUBCUTANEOUS ONCE
Refills: 0 | Status: COMPLETED | OUTPATIENT
Start: 2022-12-04 | End: 2022-12-04

## 2022-12-04 RX ORDER — BISOPROLOL FUMARATE 10 MG/1
1 TABLET, FILM COATED ORAL
Qty: 0 | Refills: 0 | DISCHARGE

## 2022-12-04 RX ORDER — DIAZEPAM 5 MG
5 TABLET ORAL ONCE
Refills: 0 | Status: DISCONTINUED | OUTPATIENT
Start: 2022-12-04 | End: 2022-12-04

## 2022-12-04 RX ORDER — ONDANSETRON 8 MG/1
4 TABLET, FILM COATED ORAL ONCE
Refills: 0 | Status: COMPLETED | OUTPATIENT
Start: 2022-12-04 | End: 2022-12-04

## 2022-12-04 RX ORDER — LISINOPRIL 2.5 MG/1
10 TABLET ORAL DAILY
Refills: 0 | Status: DISCONTINUED | OUTPATIENT
Start: 2022-12-04 | End: 2022-12-08

## 2022-12-04 RX ORDER — ATORVASTATIN CALCIUM 80 MG/1
40 TABLET, FILM COATED ORAL AT BEDTIME
Refills: 0 | Status: DISCONTINUED | OUTPATIENT
Start: 2022-12-04 | End: 2022-12-08

## 2022-12-04 RX ORDER — RAMIPRIL 5 MG
1 CAPSULE ORAL
Qty: 0 | Refills: 0 | DISCHARGE

## 2022-12-04 RX ORDER — ASPIRIN/CALCIUM CARB/MAGNESIUM 324 MG
81 TABLET ORAL DAILY
Refills: 0 | Status: DISCONTINUED | OUTPATIENT
Start: 2022-12-04 | End: 2022-12-08

## 2022-12-04 RX ADMIN — ONDANSETRON 4 MILLIGRAM(S): 8 TABLET, FILM COATED ORAL at 09:22

## 2022-12-04 RX ADMIN — ATORVASTATIN CALCIUM 40 MILLIGRAM(S): 80 TABLET, FILM COATED ORAL at 21:36

## 2022-12-04 RX ADMIN — Medication 5 MILLIGRAM(S): at 06:10

## 2022-12-04 RX ADMIN — ENOXAPARIN SODIUM 40 MILLIGRAM(S): 100 INJECTION SUBCUTANEOUS at 21:35

## 2022-12-04 RX ADMIN — Medication 25 MILLIGRAM(S): at 04:31

## 2022-12-04 RX ADMIN — SODIUM CHLORIDE 1000 MILLILITER(S): 9 INJECTION INTRAMUSCULAR; INTRAVENOUS; SUBCUTANEOUS at 04:31

## 2022-12-04 RX ADMIN — ONDANSETRON 4 MILLIGRAM(S): 8 TABLET, FILM COATED ORAL at 04:32

## 2022-12-04 NOTE — ED ADULT NURSE NOTE - OBJECTIVE STATEMENT
Pt reports having vomiting and dizziness since 2100 last night. Denies LOC. Denies chest pain. Denies SOB. Pt in no acute distress.

## 2022-12-04 NOTE — H&P ADULT - ASSESSMENT
Patient is a Greek speaking 71yo female with hx of HTN (not noted to be on medication), presents with dizziness. Upon evaluation in the ED, patient afebrile and hemodynamically stable. In ED patient given meclizine with no resolution of symptoms. CT head showing findings suggestive of Left ICA thrombus, afterwards Code Stroke was called CTA head and neck, CT perfusion noted to be normal. Patient admitted to telemetry  further evaluation of dizziness r/o posterior circulation CVA.  Patient is a Vietnamese speaking 71yo female with hx of HTN (not noted to be on medication), presents with dizziness. Upon evaluation in the ED, patient afebrile and hemodynamically stable. In ED patient given meclizine with no resolution of symptoms. CT head showing findings suggestive of Left ICA thrombus, afterwards Code Stroke was called CTA head and neck, CT perfusion noted to be normal. Patient admitted to telemetry  further evaluation of dizziness r/o  CVA.

## 2022-12-04 NOTE — H&P ADULT - NSHPPHYSICALEXAM_GEN_ALL_CORE
PHYSICAL EXAM:  GENERAL: NAD, speaks in full sentences, no signs of respiratory distress  HEAD:  Atraumatic, Normocephalic  EYES: EOMI, PERRLA, conjunctiva and sclera clear  NECK: Supple  CHEST/LUNG: Clear to auscultation bilaterally; No wheeze; No crackles; No accessory muscles used  HEART: Regular rate and rhythm; No murmurs; gallops or rubs   ABDOMEN: Soft, Nontender, Nondistended; Bowel sounds present; No guarding  EXTREMITIES:  2+ Peripheral Pulses, No cyanosis or edema  PSYCH: AAOx3  NEUROLOGY: CN II-XII intact b/l 5/5 strength noted in upper and lower extremities b/l sensation intact b/l   SKIN: No rashes or lesions

## 2022-12-04 NOTE — H&P ADULT - HISTORY OF PRESENT ILLNESS
Patient is a Tongan speaking 71yo female with hx of HTN (not noted to be on medication), presents with dizziness.  Lupe ID [id number] used. Patient states that she has been having the dizziness since last night and has gotten worse throughout the night so she decided to come to the hospital. Patient denies any chest pain, syncope, LE swelling or palpitations. Patient denies any prior episodes. Patient denies any weakness, numbness or tingling, denies dysarthria. Patient states that she is feeling better but states that she has dizziness when she gets up. Patient also reported 1 episode of NBNB emesis at 5 AM this morning. Patient reports nausea but denies abdominal pain or changes to bowel habits. Patient denies fevers or chills.

## 2022-12-04 NOTE — ED PROVIDER NOTE - PROGRESS NOTE DETAILS
EKG normal sinus rhythm, rate 92, QTc 474, no ischemic changes, no ectopy  Labs with prerenal, hyperglycemia  Patient feeling a little better but still dizzy with standing.  Additional meds and CT head ordered ATTG: : Patient endorsed to me by Dr. Gould.  Presented for dizziness which improved now.  On my exam she is able to stand up and ambulate without dizziness. neuro exam - no sensory deficits, no facial asymmetry, EOMI. Pupils 3 mm reactive to light. no nystagmus. steady gait, clear speech. We will check urinalysis and follow-up head CT and reevaluate for disposition Called by the radiologist as her CT was read at 8:50 AM and findings were suggestive of an asymmetric hyperdensity of the supraclinoid left ICA segment underlying thrombus not entirely excluded.  Given she has continued dizziness was taken over immediately for CT scan with the CTA of the head and neck and perfusion imaging to further evaluate the possibility of a stroke ATTG: : Called by radiologist as the CT findings were suggestive of a left ICA thrombus.  Code stroke was activated and patient taken directly to CT scan for further imaging.  Prelim reading by the radiologist no acute pathology.  Patient still with dizziness will consider admitting to the hospital for further care and imaging. ATTG: : still with dizziness. ct as resulted in chart. consulting neuro, admit for further care.

## 2022-12-04 NOTE — H&P ADULT - NSHPSOCIALHISTORY_GEN_ALL_CORE
Patient lives with daughter Nini Guerrero [phone]. Patient denies ETOH, illicit drug use or tobacco use. Patient states that she is able to perform ADLs without help.

## 2022-12-04 NOTE — ED PROVIDER NOTE - DOMESTIC TRAVEL HIGH RISK QUESTION
LDL not at goal, stable, not worsening  I would recommend patient go on medication for cholesterol to reduce the risk of stroke and heart disease, is patient okay with this?  Normal kidney and liver test  Normal blood sugar  Normal magnesium  Normal thyroid  Normal B12  No anemia   No

## 2022-12-04 NOTE — H&P ADULT - PROBLEM SELECTOR PLAN 2
- Primary team to confirm medication - as per daughter patient is on ramipril 2.5mg QD and Bisoprolol 5mg qd  - DASH diet - as per daughter patient is on ramipril 2.5mg QD and Bisoprolol 5mg qd  - will use therapeutic exchange lisinopril 10mg qd with holding parameters  - bisoprolol not on formulary   - DASH diet

## 2022-12-04 NOTE — H&P ADULT - ATTENDING COMMENTS
73yo female with hx of HTN (not noted to be on medication), presents with dizziness.  Lupe ID [id number] used. Patient states that she has been having the dizziness since last night and has gotten worse throughout the night so she decided to come to the hospital. Patient denies any chest pain, syncope, LE swelling or palpitations. Patient denies any prior episodes. Patient denies any weakness, numbness or tingling, denies dysarthria. Patient states that she is feeling better but states that she has dizziness when she gets up. Patient also reported 1 episode of NBNB emesis at 5 AM this morning. Patient reports nausea but denies abdominal pain or changes to bowel habits. Patient denies fevers or chills.         assessment   --- dizziness, r/o cns patho, r/o arrythmia, h/o HTN    plan  --  adm to tele, aspirin, statin, cont preadmit home meds, gi and dvt prophylaxis  cbc, bmp, mg, phos, lipid, tsh, ce q8 x3, ua, vit b12,     orthostatic bp    echo    cardio cons  neuro cons.

## 2022-12-04 NOTE — ED ADULT TRIAGE NOTE - SOURCE OF INFORMATION
PACU to Inpatient Nursing Handoff    Patient Jluis Duke is a 74 year old female who speaks English.   Procedure Procedure(s):  Revision Left total knee arthroplasty   Surgeon(s) Primary: Minesh Mayo MD  Assisting: Fabiano Solorio, PALizandroC  Fellow - Assisting: Amarjit Morales MD     Allergies   Allergen Reactions     Codeine Other (See Comments)     Pasted out  Pasted out         Isolation  [unfilled]     Past Medical History   has a past medical history of Diabetes (H), Hypertension, and Sleep apnea.    Anesthesia General   Dermatome Level     Preop Meds acetaminophen (Tylenol) - time given: 0722  celecoxib (Celebrex) - time given: 0722  TXA - time given: 0722   Nerve block Not applicable   Intraop Meds dexamethasone (Decadron)  fentanyl (Sublimaze): 100 mcg total  hydromorphone (Dilaudid): .5 mg total  ondansetron (Zofran): last given at 1344   Local Meds No   Antibiotics cefazolin (Ancef) - last given at 1153     Pain Patient Currently in Pain: yes   PACU meds  acetaminophen (Tylenol): 975 mg (total dose) last given at 1539   fentanyl (Sublimaze): 100 mcg (total dose) last given at 1518   hydromorphone (Dilaudid): 1 mg (total dose) last given at 1623   hydroxizine (Vistaril/Atarax): 10 mg (total dose) last given at 1639   oxycodone (Roxicodone): 10 mg (total dose) last given at 1639   robaxin 500mg @ 1539   PCA / epidural No   Capnography Respiratory Monitoring (EtCO2): 44 mmHg   Telemetry ECG Rhythm: Normal sinus rhythm   Inpatient Telemetry Monitor Ordered? No        Labs Glucose Lab Results   Component Value Date     07/12/2022       Hgb No results found for: HGB    INR No results found for: INR   PACU Imaging Completed     Wound/Incision Incision/Surgical Site 07/12/22 Left Knee (Active)   Incision Assessment UTV 07/12/22 1655   Leidy-Incision Assessment UTV 07/12/22 1530   Closure BROOK 07/12/22 1655   Incision Drainage Amount UTV 07/12/22 1655   Incision Care Ice applied;Other (Comment) 07/12/22  1655   Dressing Intervention Clean, dry, intact 07/12/22 1655   Number of days: 0      CMS        Equipment ice pack   Other LDA       IV Access Peripheral IV 07/12/22 Anterior;Right Hand (Active)   Site Assessment WDL 07/12/22 1530   Line Status Infusing;Checked every 1 hour 07/12/22 1530   Dressing Intervention Dressing reinforced;Padding of hub 07/12/22 1445   Phlebitis Scale 0-->no symptoms 07/12/22 1530   Infiltration Scale 0 07/12/22 1530   Infiltration Site Treatment Method  None 07/12/22 1530   Number of days: 0      Blood Products Not applicable  mL   Intake/Output Date 07/12/22 0700 - 07/13/22 0659   Shift 9653-1752 3774-8151 8975-2739 24 Hour Total   INTAKE   P.O.  100  100   I.V. 1500   1500   Shift Total(mL/kg) 1500(12.68) 100(0.85)  1600(13.53)   OUTPUT   Blood 100   100   Shift Total(mL/kg) 100(0.85)   100(0.85)   Weight (kg) 118.3 118.3 118.3 118.3      Drains / Beckman     Time of void PreOp Void Prior to Procedure: 1106 (07/12/22 1105)    PostOp Urine Occurrence: 1 (07/12/22 1646)    Diapered? No   Bladder Scan Bladder Scan Volume (mL): 570 ml (07/12/22 1625)    mL (ginger ale) (07/12/22 1640)  crackers and ginger ale     Vitals    B/P: (!) 140/86  T: 97.7  F (36.5  C)    Temp src: Axillary  P:  Pulse: 84 (07/12/22 1645)          R: 17  O2:  SpO2: 96 %    O2 Device: None (Room air) (07/12/22 1645)    Oxygen Delivery: 2 LPM (07/12/22 1615)         Family/support present Angel (son)   Patient belongings     Patient transported on bed and air mat   DC meds/scripts (obs/outpt) Not applicable   Inpatient Pain Meds Released? Yes       Special needs/considerations MELVINA, did not bring home CPAP   Tasks needing completion None       Desiree Martinez RN  ASCOM *[phone]   Patient

## 2022-12-04 NOTE — H&P ADULT - PROBLEM SELECTOR PLAN 1
- patient presenting with dizziness  - s/p meclizine, IVF in ED with no resolution  - CODE stroke CT head showing possible thrombus in Left ICA no acute hemorrhage, normal CTA head and neck   - admit to medicine  - can be 2/2 to posterior CVA vs infectious causes vs orthostasis   - EKG NSR, normal troponin level   - continue with aspirin and statin   - f/u orthostatic vital signs   - f/u UA   - telemetry monitoring   - f/u echo with bubble study  - Neurology Dr. Joel   - Cardiology Dr. Gibson - patient presenting with dizziness  - s/p meclizine, IVF in ED with no resolution  - CODE stroke CT head showing possible thrombus in Left ICA no acute hemorrhage, normal CTA head and neck   - admit to medicine  - can be 2/2 to posterior CVA vs infectious causes vs orthostasis   - EKG NSR, normal troponin level   - continue with aspirin and statin   - f/u orthostatic vital signs   - f/u UA   - telemetry monitoring   - f/u echo with bubble study  - Neurology Dr. Joel consulted - patient presenting with dizziness  - s/p meclizine, IVF in ED with no resolution  - CODE stroke CT head showing possible thrombus in Left ICA no acute hemorrhage, normal CTA head and neck   - admit to medicine  - can be 2/2 to posterior CVA vs infectious causes vs orthostasis   - EKG NSR, normal troponin level   - continue with aspirin and statin   - f/u orthostatic vital signs   - f/u UA   - telemetry monitoring   - f/u echo with bubble study  - Neurology Dr. Joel consulted  - Cardiology Consulted Dr. iGbson

## 2022-12-04 NOTE — ED PROVIDER NOTE - PHYSICAL EXAMINATION
GENERAL: well appearing, no acute distress   HEAD: atraumatic   EYES: EOMI   ENT: moist oral mucosa   CARDIAC: regular rate, no edema   RESPIRATORY: no increased work of breathing, lungs clear  ABDO: soft NTND  MUSCULOSKELETAL: no deformity   NEUROLOGICAL: AAOx3, CN's II-XII intact, strength 5/5 bilateral UE and LE, sensation intact to light touch, finger to nose intact, unsteady w/ standing   SKIN: no visible rash  PSYCHIATRIC: cooperative

## 2022-12-04 NOTE — H&P ADULT - NSHPREVIEWOFSYSTEMS_GEN_ALL_CORE
CONSTITUTIONAL: No changes in appetite or  weakness.  No fever, weight loss, or fatigue  EYES: No eye pain, visual disturbances, or discharge  ENT:  No difficulty hearing, tinnitus, vertigo; No sinus or throat pain  NECK: No pain or stiffness  RESPIRATORY: No cough, wheezing, chills or hemoptysis; No Shortness of Breath  CARDIOVASCULAR: No chest pain, palpitations, passing out, leg swelling. Patient has dizziness   GASTROINTESTINAL: No abdominal or epigastric pain.  No diarrhea or constipation. No melena or hematochezia. Has nausea and vomiting   GENITOURINARY: No dysuria, frequency, hematuria, or incontinence  NEUROLOGICAL: No headaches, memory loss, loss of strength, numbness, or tremors  SKIN: No skin lesions   LYMPH Nodes: No enlarged glands  ENDOCRINE: No heat or cold intolerance; No hair loss  MUSCULOSKELETAL: No joint pain or swelling; No muscle, back, No extremity pain  PSYCHIATRIC: No depression, anxiety, mood swings, or difficulty sleeping  HEME/LYMPH: No easy bruising, or bleeding gums  ALLERGY AND IMMUNOLOGIC: No hives or eczema

## 2022-12-04 NOTE — ED PROVIDER NOTE - OBJECTIVE STATEMENT
72-year-old female past medical history of hypertension presents with dizziness x1 day, feels like room spinning, dizziness worse with standing, associated with nausea and vomiting.  Denies other acute complaints.  Bots[place]an translation via family at bedside.

## 2022-12-05 DIAGNOSIS — Z02.9 ENCOUNTER FOR ADMINISTRATIVE EXAMINATIONS, UNSPECIFIED: ICD-10-CM

## 2022-12-05 DIAGNOSIS — I63.9 CEREBRAL INFARCTION, UNSPECIFIED: ICD-10-CM

## 2022-12-05 LAB
A1C WITH ESTIMATED AVERAGE GLUCOSE RESULT: 5.6 % — SIGNIFICANT CHANGE UP (ref 4–5.6)
ALBUMIN SERPL ELPH-MCNC: 3.3 G/DL — LOW (ref 3.5–5)
ALP SERPL-CCNC: 81 U/L — SIGNIFICANT CHANGE UP (ref 40–120)
ALT FLD-CCNC: 18 U/L DA — SIGNIFICANT CHANGE UP (ref 10–60)
ANION GAP SERPL CALC-SCNC: 8 MMOL/L — SIGNIFICANT CHANGE UP (ref 5–17)
AST SERPL-CCNC: 12 U/L — SIGNIFICANT CHANGE UP (ref 10–40)
BILIRUB SERPL-MCNC: 0.6 MG/DL — SIGNIFICANT CHANGE UP (ref 0.2–1.2)
BUN SERPL-MCNC: 21 MG/DL — HIGH (ref 7–18)
CALCIUM SERPL-MCNC: 9.7 MG/DL — SIGNIFICANT CHANGE UP (ref 8.4–10.5)
CHLORIDE SERPL-SCNC: 106 MMOL/L — SIGNIFICANT CHANGE UP (ref 96–108)
CHOLEST SERPL-MCNC: 200 MG/DL — HIGH
CO2 SERPL-SCNC: 30 MMOL/L — SIGNIFICANT CHANGE UP (ref 22–31)
CREAT SERPL-MCNC: 0.83 MG/DL — SIGNIFICANT CHANGE UP (ref 0.5–1.3)
EGFR: 75 ML/MIN/1.73M2 — SIGNIFICANT CHANGE UP
ESTIMATED AVERAGE GLUCOSE: 114 MG/DL — SIGNIFICANT CHANGE UP (ref 68–114)
GLUCOSE SERPL-MCNC: 113 MG/DL — HIGH (ref 70–99)
HCT VFR BLD CALC: 40.6 % — SIGNIFICANT CHANGE UP (ref 34.5–45)
HDLC SERPL-MCNC: 65 MG/DL — SIGNIFICANT CHANGE UP
HGB BLD-MCNC: 13 G/DL — SIGNIFICANT CHANGE UP (ref 11.5–15.5)
LIPID PNL WITH DIRECT LDL SERPL: 95 MG/DL — SIGNIFICANT CHANGE UP
MAGNESIUM SERPL-MCNC: 2 MG/DL — SIGNIFICANT CHANGE UP (ref 1.6–2.6)
MCHC RBC-ENTMCNC: 29.1 PG — SIGNIFICANT CHANGE UP (ref 27–34)
MCHC RBC-ENTMCNC: 32 GM/DL — SIGNIFICANT CHANGE UP (ref 32–36)
MCV RBC AUTO: 90.8 FL — SIGNIFICANT CHANGE UP (ref 80–100)
NON HDL CHOLESTEROL: 135 MG/DL — HIGH
NRBC # BLD: 0 /100 WBCS — SIGNIFICANT CHANGE UP (ref 0–0)
PHOSPHATE SERPL-MCNC: 3.1 MG/DL — SIGNIFICANT CHANGE UP (ref 2.5–4.5)
PLATELET # BLD AUTO: 374 K/UL — SIGNIFICANT CHANGE UP (ref 150–400)
POTASSIUM SERPL-MCNC: 3.6 MMOL/L — SIGNIFICANT CHANGE UP (ref 3.5–5.3)
POTASSIUM SERPL-SCNC: 3.6 MMOL/L — SIGNIFICANT CHANGE UP (ref 3.5–5.3)
PROT SERPL-MCNC: 6.8 G/DL — SIGNIFICANT CHANGE UP (ref 6–8.3)
RBC # BLD: 4.47 M/UL — SIGNIFICANT CHANGE UP (ref 3.8–5.2)
RBC # FLD: 13.3 % — SIGNIFICANT CHANGE UP (ref 10.3–14.5)
SODIUM SERPL-SCNC: 144 MMOL/L — SIGNIFICANT CHANGE UP (ref 135–145)
TRIGL SERPL-MCNC: 199 MG/DL — HIGH
TSH SERPL-MCNC: 0.19 UU/ML — LOW (ref 0.34–4.82)
VIT B12 SERPL-MCNC: <150 PG/ML — LOW (ref 232–1245)
WBC # BLD: 8.47 K/UL — SIGNIFICANT CHANGE UP (ref 3.8–10.5)
WBC # FLD AUTO: 8.47 K/UL — SIGNIFICANT CHANGE UP (ref 3.8–10.5)

## 2022-12-05 PROCEDURE — 93306 TTE W/DOPPLER COMPLETE: CPT | Mod: 26

## 2022-12-05 PROCEDURE — 99223 1ST HOSP IP/OBS HIGH 75: CPT

## 2022-12-05 PROCEDURE — 70551 MRI BRAIN STEM W/O DYE: CPT | Mod: 26

## 2022-12-05 RX ORDER — PREGABALIN 225 MG/1
1000 CAPSULE ORAL DAILY
Refills: 0 | Status: DISCONTINUED | OUTPATIENT
Start: 2022-12-05 | End: 2022-12-08

## 2022-12-05 RX ORDER — METOPROLOL TARTRATE 50 MG
50 TABLET ORAL
Refills: 0 | Status: DISCONTINUED | OUTPATIENT
Start: 2022-12-05 | End: 2022-12-08

## 2022-12-05 RX ADMIN — Medication 50 MILLIGRAM(S): at 17:24

## 2022-12-05 RX ADMIN — Medication 81 MILLIGRAM(S): at 11:55

## 2022-12-05 RX ADMIN — ATORVASTATIN CALCIUM 40 MILLIGRAM(S): 80 TABLET, FILM COATED ORAL at 22:41

## 2022-12-05 RX ADMIN — ENOXAPARIN SODIUM 40 MILLIGRAM(S): 100 INJECTION SUBCUTANEOUS at 22:41

## 2022-12-05 RX ADMIN — LISINOPRIL 10 MILLIGRAM(S): 2.5 TABLET ORAL at 05:12

## 2022-12-05 RX ADMIN — PREGABALIN 1000 MICROGRAM(S): 225 CAPSULE ORAL at 13:49

## 2022-12-05 NOTE — CONSULT NOTE ADULT - TIME BILLING
I counseled the patient and primary team about the patient's differential diagnoses, as well as the further testing indicated to help confirm the patient's diagnosis.

## 2022-12-05 NOTE — CONSULT NOTE ADULT - SUBJECTIVE AND OBJECTIVE BOX
NEUROLOGY CONSULT NOTE    NAME:  AMPARO REYESUCETA      ASSESSMENT:  72 RHF with acute onset of transient dizziness, nausea, and vomiting, concerning for posterior circulation transient ischemic attack vs. ischemic stroke vs. gastrointestinal infection      RECOMMENDATIONS:      1. Stroke workup  - MRI Brain approved to evaluate for posterior circulation stroke  - Transthoracic Echocardiogram  - Telemetry monitoring while inpatient  - Hemoglobin A1c      2. Secondary stroke prevention  - Q4H Neurochecks & Vital signs  - Bedside swallow evaluation before administering any PO meds  - Aspirin 81mg PO Daily (or Aspirin 300mg AK daily if NPO)  - Atorvastatin 40mg PO QHS (goal LDL < 70 mg/dL)  - Treat BP if over 140/90 (goal /80) - No role for permissive hypertension now given resolution of acute symptoms  - Consider adding Ondansetron 4mg or Metoclopramide 5mg IV Q8H PRN Nausea/Vomiting/Headache  - Hyperglycemia management as per primary team  - PT/OT  - DVT ppx: SCDs, Enoxaparin        NOTE TO BE COMPLETED - PLEASE REFER TO ABOVE ONLY AND IGNORE INFORMATION BELOW    *******************************      CHIEF COMPLAINT:  Patient is a 72y old  Female who presents with a chief complaint of Dizziness (05 Dec 2022 06:37)      HPI:  Patient is a Ukrainian speaking 73yo female with hx of HTN (not noted to be on medication), presents with dizziness.  Lupe ID [id number] used. Patient states that she has been having the dizziness since last night and has gotten worse throughout the night so she decided to come to the hospital. Patient denies any chest pain, syncope, LE swelling or palpitations. Patient denies any prior episodes. Patient denies any weakness, numbness or tingling, denies dysarthria. Patient states that she is feeling better but states that she has dizziness when she gets up. Patient also reported 1 episode of NBNB emesis at 5 AM this morning. Patient reports nausea but denies abdominal pain or changes to bowel habits. Patient denies fevers or chills.  (04 Dec 2022 13:28)      NEURO HPI:      PAST MEDICAL & SURGICAL HISTORY:  HTN (hypertension)      No significant past surgical history          MEDICATIONS:  aspirin  chewable 81 milliGRAM(s) Oral daily  atorvastatin 40 milliGRAM(s) Oral at bedtime  enoxaparin Injectable 40 milliGRAM(s) SubCutaneous every 24 hours  lisinopril 10 milliGRAM(s) Oral daily      ALLERGIES:  No Known Allergies      FAMILY HISTORY:  No pertinent family history in first degree relatives          SOCIAL HISTORY:  Denies alcohol, tobacco, or illicit drug use      REVIEW OF SYSTEMS:  GENERAL: No fever, weight changes, fatigue  EYES: No eye pain or discharge  EAR/NOSE/MOUTH/THROAT: No sinus or throat pain; No difficulty hearing  NECK: No pain or stiffness  RESPIRATORY: No cough, wheezing, chills, or hemoptysis  CARDIOVASCULAR: No chest pain, palpitations, shortness of breath, or dyspnea on exertion  GASTROINTESTINAL: No abdominal pain, nausea, vomiting, hematemesis, diarrhea, or constipation  GENITOURINARY: No dysuria, frequency, hematuria, or incontinence  SKIN: No rashes or lesions  ENDOCRINE: No heat or cold intolerance  HEMATOLOGIC: No easy bruising or bleeding  PSYCHIATRIC: No depression, anxiety, or mood swings  MUSCULOSKELETAL: No joint pain or swelling  NEUROLOGICAL: As per HPI        OBJECTIVE:    Vital Signs Last 24 Hrs  T(C): 36.9 (05 Dec 2022 08:01), Max: 36.9 (05 Dec 2022 08:01)  T(F): 98.5 (05 Dec 2022 08:01), Max: 98.5 (05 Dec 2022 08:01)  HR: 85 (05 Dec 2022 08:01) (76 - 109)  BP: 151/74 (05 Dec 2022 08:01) (122/65 - 154/72)  BP(mean): --  RR: 18 (05 Dec 2022 08:01) (17 - 18)  SpO2: 98% (05 Dec 2022 08:01) (98% - 100%)    Parameters below as of 05 Dec 2022 05:10  Patient On (Oxygen Delivery Method): room air        General Examination:  General: No acute distress  HEENT: Atraumatic, Normocephalic  Respiratory: CTA B/l.  No crackles, rhonchi, or wheezes.  Cardiovascular: RRR.  Normal S1 & S2.  Normal b/l radial and pedal pulses.    Neurological Examination:  General / Mental Status: AAO x 3.  No aphasia or dysarthria.  Naming and repetition intact.  Cranial Nerves: VFF x 4.  PERRL.  EOMI x 2, No nystagmus or diplopia.  B/l V1-V3 equal and intact to light touch and pinprick.  Symmetric facial movement and palate elevation.  B/l hearing equal to finger rub.  5/5 strength with b/l sternocleidomastoid & trapezius.  Midline tongue protrusion, with no atrophy or fasciculations.  Motor: Normal bulk & tone in all four extremities.  5/5 strength throughout all four extremities.  No downward drift, rigidity, spasticity, or tremors in any of the four extremities.  Sensory: Intact to light touch and pinprick in all four extremities.  Negative Romberg.  Reflex: 2+ and symmetric at b/l biceps, triceps, brachioradialis, patellae, and ankles.  Downgoing toes b/l.  Coordination: No dysmetria with b/l finger-to-nose and heel raise tests.  Symmetric rapid alternating movements b/l.  Gait: Normal, narrow-based gait.  No difficulty with tiptoe, heel, and tandem gaits.        LABORATORY VALUES:                        13.0   8.47  )-----------( 374      ( 05 Dec 2022 04:05 )             40.6       12-05    144  |  106  |  21<H>  ----------------------------<  113<H>  3.6   |  30  |  0.83    Ca    9.7      05 Dec 2022 04:05  Phos  3.1     12-05  Mg     2.0     12-05    TPro  6.8  /  Alb  3.3<L>  /  TBili  0.6  /  DBili  x   /  AST  12  /  ALT  18  /  AlkPhos  81  12-05    12-05 Chol 200<H> LDL 95 HDL 65 Trig 199<H>                    NEUROIMAGING:          Please contact the Neurology consult service with any neurological questions.    Hever Joel MD   of Neurology  [facility] at [facility] NEUROLOGY CONSULT NOTE    NAME:  AMPARO REYESUCETA      ASSESSMENT:  72 RHF with acute onset of transient dizziness, nausea, and vomiting, concerning for posterior circulation transient ischemic attack vs. ischemic stroke vs. gastrointestinal infection      RECOMMENDATIONS:      1. Stroke workup  - MRI Brain approved to evaluate for posterior circulation stroke  - Transthoracic Echocardiogram  - Telemetry monitoring while inpatient  - Hemoglobin A1c      2. Secondary stroke prevention  - Q4H Neurochecks & Vital signs  - Bedside swallow evaluation before administering any PO meds  - Aspirin 81mg PO Daily (or Aspirin 300mg [place] daily if NPO)  - Atorvastatin 40mg PO QHS (goal LDL < 70 mg/dL)  - Treat BP if over 140/90 (goal /80) - No role for permissive hypertension now given resolution of acute symptoms  - Consider adding Ondansetron 4mg or Metoclopramide 5mg IV Q8H PRN Nausea/Vomiting/Headache  - Hyperglycemia management as per primary team  - PT/OT  - DVT ppx: SCDs, Enoxaparin        *******************************      CHIEF COMPLAINT:  Patient is a 72y old  Female who presents with a chief complaint of Dizziness (05 Dec 2022 06:37)      HPI:  Patient is a Namibian speaking 73yo female with hx of HTN (not noted to be on medication), presents with dizziness.  Lupe ID [id number] used. Patient states that she has been having the dizziness since last night and has gotten worse throughout the night so she decided to come to the hospital. Patient denies any chest pain, syncope, LE swelling or palpitations. Patient denies any prior episodes. Patient denies any weakness, numbness or tingling, denies dysarthria. Patient states that she is feeling better but states that she has dizziness when she gets up. Patient also reported 1 episode of NBNB emesis at 5 AM this morning. Patient reports nausea but denies abdominal pain or changes to bowel habits. Patient denies fevers or chills.  (04 Dec 2022 13:28)      NEURO HPI:  Interview conducted in [place]ibian. This is a 72-year-old right-handed woman who experienced acute onset of room-spinning sensation when standing, which worsened overnight prior to presentation, but which has improved since admission to the hospital. She also experienced nausea and vomiting on the morning of admission, but not since being admitted.       PAST MEDICAL & SURGICAL HISTORY:  HTN (hypertension)  No significant past surgical history      MEDICATIONS:  aspirin  chewable 81 milliGRAM(s) Oral daily  atorvastatin 40 milliGRAM(s) Oral at bedtime  enoxaparin Injectable 40 milliGRAM(s) SubCutaneous every 24 hours  lisinopril 10 milliGRAM(s) Oral daily      ALLERGIES:  No Known Allergies      FAMILY HISTORY:  Denies family history of stroke      SOCIAL HISTORY:  Denies alcohol, tobacco, or illicit drug use      REVIEW OF SYSTEMS:  GENERAL: No fever, weight changes, fatigue  EYES: No eye pain or discharge  EAR/NOSE/MOUTH/THROAT: No sinus or throat pain; No difficulty hearing  NECK: No pain or stiffness  RESPIRATORY: No cough, wheezing, chills, or hemoptysis  CARDIOVASCULAR: No chest pain, palpitations, shortness of breath, or dyspnea on exertion  GASTROINTESTINAL: Recent nausea and vomiting; No abdominal pain, hematemesis, diarrhea, or constipation  GENITOURINARY: No dysuria, frequency, hematuria, or incontinence  SKIN: No rashes or lesions  ENDOCRINE: No heat or cold intolerance  HEMATOLOGIC: No easy bruising or bleeding  PSYCHIATRIC: No depression, anxiety, or mood swings  MUSCULOSKELETAL: No joint pain or swelling  NEUROLOGICAL: As per HPI        OBJECTIVE:    Vital Signs Last 24 Hrs  T(C): 36.9 (05 Dec 2022 08:01), Max: 36.9 (05 Dec 2022 08:01)  T(F): 98.5 (05 Dec 2022 08:01), Max: 98.5 (05 Dec 2022 08:01)  HR: 85 (05 Dec 2022 08:01) (76 - 109)  BP: 151/74 (05 Dec 2022 08:01) (122/65 - 154/72)  RR: 18 (05 Dec 2022 08:01) (17 - 18)  SpO2: 98% (05 Dec 2022 08:01) (98% - 100%)  Parameters below as of 05 Dec 2022 05:10  Patient On (Oxygen Delivery Method): room air      General Examination:  General: No acute distress  HEENT: Atraumatic, Normocephalic  Respiratory: CTA B/l.  No crackles, rhonchi, or wheezes.  Cardiovascular: RRR.  Normal S1 & S2.  Normal b/l radial and pedal pulses.    Neurological Examination:  General / Mental Status: AAO x 3.  No aphasia or dysarthria.  Naming and repetition intact.  Cranial Nerves: VFF x 4.  PERRL.  EOMI x 2, No nystagmus or diplopia.  B/l V1-V3 equal and intact to light touch and pinprick.  Symmetric facial movement and palate elevation.  B/l hearing equal to finger rub.  Negative Lilliana-Hallpike maneuver b/l.  5/5 strength with b/l sternocleidomastoid & trapezius.  Midline tongue protrusion, with no atrophy or fasciculations.  Motor: Normal bulk & tone in all four extremities.  5/5 strength throughout all four extremities.  No downward drift, rigidity, spasticity, or tremors in any of the four extremities.  Sensory: Intact to light touch and pinprick in all four extremities.  Negative Romberg.  Reflex: 2+ and symmetric at b/l biceps, triceps, brachioradialis, patellae, and ankles.  Downgoing toes b/l.  Coordination: No dysmetria with b/l finger-to-nose and heel raise tests.  Symmetric rapid alternating movements b/l.  Gait: Normal, narrow-based gait.  No difficulty with tiptoe, heel, and tandem gaits.      NIHSS Score:    LOC - 0  LOC Questions - 0  LOC Commands - 0  Gaze Preference - 0  Visual Fields - 0  Facial Palsy - 0  Motor Arm Left - 0  Motor Arm Right - 0  Motor Leg Left - 0  Motor Leg Right - 0  Limb Ataxia - 0  Sensory - 0  Language - 0  Speech - 0  Extinction - 0    NIHSS Score Total: 0 - No IV tPA because patient presented outside the time window    Modified Canterbury Scale: 1          LABORATORY VALUES:                        13.0   8.47  )-----------( 374      ( 05 Dec 2022 04:05 )             40.6       12-05    144  |  106  |  21<H>  ----------------------------<  113<H>  3.6   |  30  |  0.83    Ca    9.7      05 Dec 2022 04:05  Phos  3.1     12-05  Mg     2.0     12-05    TPro  6.8  /  Alb  3.3<L>  /  TBili  0.6  /  DBili  x   /  AST  12  /  ALT  18  /  AlkPhos  81  12-05 12-05 Chol 200<H> LDL 95 HDL 65 Trig 199<H>    A1C with Estimated Average Glucose in AM (12.05.22 @ 04:05)   A1C with Estimated Average Glucose Result: 5.6%   Estimated Average Glucose: 114 mg/dL           NEUROIMAGING:      CT Head & CTA Head/Neck & CT Perfusion Head (12/4/22):  - No acute intracranial structural abnormality  - Left ICA narrowing without definite thrombus  - No other intracranial or neck vessel abnormality  - No focal hypoperfusion      Echo (12/5/22):  - LVEF 55-60%  - Trace mitral and tricuspid regurgitation  - No cardiac embolus or intracardiac shunt identified          Please contact the Neurology consult service with any neurological questions.    Hever Joel MD   of Neurology  [facility] at [facility]

## 2022-12-05 NOTE — CONSULT NOTE ADULT - SUBJECTIVE AND OBJECTIVE BOX
CHIEF COMPLAINT:Patient is a 72y old  Female who presents with a chief complaint of Dizziness.      HPI:  Patient is a Occitan speaking 71yo female with hx of HTN (not noted to be on medication), presents with dizziness.  Lupe ID [id number] used. Patient states that she has been having the dizziness since last night and has gotten worse throughout the night so she decided to come to the hospital. Patient denies any chest pain, syncope, LE swelling or palpitations. Patient denies any prior episodes. Patient denies any weakness, numbness or tingling, denies dysarthria. Patient states that she is feeling better but states that she has dizziness when she gets up. Patient also reported 1 episode of NBNB emesis at 5 AM this morning. Patient reports nausea but denies abdominal pain or changes to bowel habits. Patient denies fevers or chills.  (04 Dec 2022 13:28)      PAST MEDICAL & SURGICAL HISTORY:  HTN (hypertension)        MEDICATIONS  (STANDING):  aspirin  chewable 81 milliGRAM(s) Oral daily  atorvastatin 40 milliGRAM(s) Oral at bedtime  cyanocobalamin Injectable 1000 MICROGram(s) SubCutaneous daily  enoxaparin Injectable 40 milliGRAM(s) SubCutaneous every 24 hours  lisinopril 10 milliGRAM(s) Oral daily    MEDICATIONS  (PRN):      FAMILY HISTORY:  No pertinent family history in first degree relatives        SOCIAL HISTORY:    [x ] Non-smoker    [ x] Alcohol-denies    Allergies    No Known Allergies    Intolerances    	    REVIEW OF SYSTEMS:  CONSTITUTIONAL: No fever, weight loss, or fatigue  EYES: No eye pain, visual disturbances, or discharge  ENT:  No difficulty hearing, tinnitus, vertigo; No sinus or throat pain  NECK: No pain or stiffness  RESPIRATORY: No cough, wheezing, chills or hemoptysis; No Shortness of Breath  CARDIOVASCULAR: No chest pain, palpitations, passing out, dizziness, or leg swelling  GASTROINTESTINAL: No abdominal or epigastric pain. No nausea, vomiting, or hematemesis; No diarrhea or constipation. No melena or hematochezia.  GENITOURINARY: No dysuria, frequency, hematuria, or incontinence  NEUROLOGICAL: No headaches, memory loss, loss of strength, numbness, or tremors  SKIN: No itching, burning, rashes, or lesions   LYMPH Nodes: No enlarged glands  ENDOCRINE: No heat or cold intolerance; No hair loss  MUSCULOSKELETAL: No joint pain or swelling; No muscle, back, or extremity pain  PSYCHIATRIC: No depression, anxiety, mood swings, or difficulty sleeping  HEME/LYMPH: No easy bruising, or bleeding gums  ALLERGY AND IMMUNOLOGIC: No hives or eczema	      PHYSICAL EXAM:  T(C): 36.9 (12-05-22 @ 08:01), Max: 36.9 (12-05-22 @ 08:01)  HR: 85 (12-05-22 @ 08:01) (76 - 109)  BP: 151/74 (12-05-22 @ 08:01) (122/65 - 154/72)  RR: 18 (12-05-22 @ 08:01) (17 - 18)  SpO2: 98% (12-05-22 @ 08:01) (98% - 100%)  Wt(kg): --  I&O's Summary      Appearance: Normal	  HEENT:   Normal oral mucosa, PERRL, EOMI	  Lymphatic: No lymphadenopathy  Cardiovascular: Normal S1 S2, No JVD, No murmurs, No edema  Respiratory: Lungs clear to auscultation	  Psychiatry: A & O x 3, Mood & affect appropriate  Gastrointestinal:  Soft, Non-tender, + BS	  Skin: No rashes, No ecchymoses, No cyanosis	  Neurologic: Non-focal  Extremities: Normal range of motion, No clubbing, cyanosis or edema  Vascular: Peripheral pulses palpable 2+ bilaterally    	  	  LABS:	 	    Troponin I, High Sensitivity Result: 6.6 ng/L (12-04-22 @ 04:43)                          13.0   8.47  )-----------( 374      ( 05 Dec 2022 04:05 )             40.6     12-05    144  |  106  |  21<H>  ----------------------------<  113<H>  3.6   |  30  |  0.83    Ca    9.7      05 Dec 2022 04:05  Phos  3.1     12-05  Mg     2.0     12-05    TPro  6.8  /  Alb  3.3<L>  /  TBili  0.6  /  DBili  x   /  AST  12  /  ALT  18  /  AlkPhos  81  12-05    proBNP:   Lipid Profile: Cholesterol 200  LDL --  HDL 65    ldl calc 95  Ratio --    HgA1c:   TSH: Thyroid Stimulating Hormone, Serum: 0.19 uU/mL (12-05 @ 04:05)      Vitamin B12, Serum: <150: Test Repeated pg/mL (12.05.22 @ 04:05) < from: CT Angio Head w/ IV Cont (12.04.22 @ 09:35) >    ACC: [id number] EXAM:  CT BRAIN PERFUSION MAPS STROKE                        ACC: [id number] EXAM:  CT ANGIO NECK (W)AW IC                        ACC: [id number] EXAM:  CT ANGIO BRAIN (W)AW IC                          PROCEDURE DATE:  12/04/2022          INTERPRETATION:  CT PERFUSION, CTA OF THE [place] OF SANDHU AND NECK:    INDICATIONS: Dizziness.    TECHNIQUE:      CTA [place]igh[place] OF SANDHU:    After the intravenous power injection of non-ionic contrast material,   serial thin sections were obtained through the intracranial circulation   on a multislice CT scanner.  Images were reformatted using a dedicated 3D   software package and viewed on a dedicated workstation in multiple   planes. MIP image analysis was performed on a separate workstation.    CTA NECK:    After the intravenous power injection of non-ionic contrast material,   serial thin sections were obtained through the cervical circulation on a   multislice CT scanner.  Images were reformatted using a dedicated 3D   software package and viewed on a dedicated workstation in multiple   planes. MIP image analysis was performed on a separate workstation.    CONTRAST: 110 mL Omnipaque 350 was administered. 90 mL was discarded.      COMPARISON EXAMINATION: CT head from same day.    FINDINGS:        CT RAPID PERFUSION:    INFARCT CORE: 0 mL    TISSUE AT RISK: 0 mL    MISMATCH RATIO: None.      CTA [place] OF SANDHU:    ANTERIOR CIRCULATION    ICA  CAVERNOUS, SUPRACLINOID, BIFURCATION SEGMENTS: Patent without flow   limiting stenosis. The right posterior communicating arteries present.    ANTERIOR CEREBRAL ARTERIES: Bilateral A1, anterior communicating and A2   anterior cerebral arteries are unremarkable in course and caliber without   flow limiting stenosis.    MIDDLE CEREBRAL ARTERIES: Patent bilateral M1, M2, and distal MCA   branches without flow limiting stenosis.    POSTERIOR CIRCULATION:    VERTEBRAL ARTERIES: Patent without flow limiting stenosis    BASILAR ARTERY: Patent no flow limiting stenosis.    POSTERIOR CEREBRAL ARTERIES: Fetal origin of the right posterior cerebral   artery. Patent without flow limiting stenosis.    CTA NECK:    GREAT VESSELS: Visualized segments are patent, no flow limiting stenosis.    COMMON CAROTID ARTERIES:  RIGHT: Patent without flow limiting stenosis  LEFT: Patent without flow limiting stenosis    INTERNAL CAROTID ARTERIES:  RIGHT: Patent no evidence for any hemodynamically significant stenosis at   the ICA origin by NASCET criteria.  LEFT: Patent no evidence for any hemodynamically significant stenosis at   the ICA origin by NASCET criteria.    VERTEBRAL ARTERIES:    RIGHT: Patent no evidence for any flow limiting stenosis.  LEFT: Patent no evidence for any flow limiting stenosis.      SOFT TISSUES: Unremarkable    BONES: Unremarkable    IMPRESSION:    CT PERFUSION demonstrated: No asymmetric or territorial perfusion   abnormality.    If symptoms persist consider follow up head CT or MRI, MRA  if no   contraindication.    CTA COW:  Patent intracranial circulation without flow limiting stenosis   or large vessel occlusion.    CTA NECK: Patent, ECAs, ICAs, no  hemodynamically significant stenosis at    ICA origins by NASCET criteria.  Bilateral vertebral arteries are patent without flow limiting stenosis.    --- End of Report ---            KAYY RIDLEY MD; Attending Radiologist  This document has been electronically signed. Dec  4 2022 10:56AM

## 2022-12-05 NOTE — SWALLOW BEDSIDE ASSESSMENT ADULT - PHARYNGEAL PHASE
Decreased laryngeal elevation Decreased laryngeal elevation/Within functional limits Decreased laryngeal elevation/Cough post oral intake

## 2022-12-05 NOTE — SWALLOW BEDSIDE ASSESSMENT ADULT - SLP PERTINENT HISTORY OF CURRENT PROBLEM
Patient is a Romanian speaking 72 year old female with PMHx of HTN (not noted to be on medication). As per EMR, Patient presented to ED with dizziness, nausea and vomiting. As per radiology reports, CT head (12/4/22) showing findings suggestive of Left ICA thrombus, afterwards Code Stroke was called CTA head and neck, CT perfusion (12/4/22) noted to be normal. Patient admitted to telemetry for possible posterior circulation transient ischemic attack vs. ischemic stroke vs. GI infection.

## 2022-12-05 NOTE — PROGRESS NOTE ADULT - SUBJECTIVE AND OBJECTIVE BOX
Patient is a 72y old  Female who presents with a chief complaint of Dizziness (04 Dec 2022 13:28)    pt seen in icu [  ], reg med floor [   ], bed [  ], chair at bedside [   ], a+o x3 [  ], lethargic [  ],  nad [  ]    miranda [  ], ngt [  ], peg [  ], et tube [  ], cent line [  ], picc line [  ]        Allergies    No Known Allergies        Vitals    T(F): 98.3 (12-05-22 @ 05:10), Max: 98.3 (12-04-22 @ 20:56)  HR: 76 (12-05-22 @ 05:10) (76 - 109)  BP: 125/61 (12-05-22 @ 05:10) (122/65 - 154/72)  RR: 18 (12-05-22 @ 05:10) (17 - 18)  SpO2: 99% (12-05-22 @ 05:10) (97% - 100%)  Wt(kg): --  CAPILLARY BLOOD GLUCOSE      POCT Blood Glucose.: 267 mg/dL (04 Dec 2022 09:02)      Labs                          13.8   7.94  )-----------( 371      ( 04 Dec 2022 04:43 )             42.7       12-05    144  |  106  |  21<H>  ----------------------------<  113<H>  3.6   |  30  |  0.83    Ca    9.7      05 Dec 2022 04:05  Phos  3.1     12-05  Mg     2.0     12-05    TPro  6.8  /  Alb  3.3<L>  /  TBili  0.6  /  DBili  x   /  AST  12  /  ALT  18  /  AlkPhos  81  12-05          Troponin I, High Sensitivity Result: 6.6 ng/L (12-04-22 @ 04:43)    .Blood Blood-Peripheral  10-14 @ 16:50   No Growth Final  --  --      Clean Catch Clean Catch (Midstream)  10-14 @ 16:45   No growth  --  --      .Blood Blood-Peripheral  10-14 @ 16:40   No Growth Final  --  --          Radiology Results      Meds    MEDICATIONS  (STANDING):  aspirin  chewable 81 milliGRAM(s) Oral daily  atorvastatin 40 milliGRAM(s) Oral at bedtime  enoxaparin Injectable 40 milliGRAM(s) SubCutaneous every 24 hours  lisinopril 10 milliGRAM(s) Oral daily      MEDICATIONS  (PRN):      Physical Exam    Neuro :  no focal deficits  Respiratory: CTA B/L  CV: RRR, S1S2, no murmurs,   Abdominal: Soft, NT, ND +BS,  Extremities: No edema, + peripheral pulses    ASSESSMENT    dizziness, r/o cns patho, r/o arrythmia, h/o HTN    Dizziness and giddiness    HTN (hypertension)    No significant past surgical history        PLAN    adm to tele,   aspirin,   statin,   cont preadmit home meds,   gi and dvt prophylaxis  cbc,   bmp,   mg,   phos,   lipid,   tsh,   ce q8 x3,   ua,   vit b12,     orthostatic bp    echo    cardio cons  neuro cons.     Patient is a 72y old  Female who presents with a chief complaint of Dizziness (04 Dec 2022 13:28)    pt seen in icu [  ], reg med floor [   ], bed [  ], chair at bedside [   ], a+o x3 [  ], lethargic [  ],  nad [  ]    miranda [  ], ngt [  ], peg [  ], et tube [  ], cent line [  ], picc line [  ]        Allergies    No Known Allergies        Vitals    T(F): 98.3 (12-05-22 @ 05:10), Max: 98.3 (12-04-22 @ 20:56)  HR: 76 (12-05-22 @ 05:10) (76 - 109)  BP: 125/61 (12-05-22 @ 05:10) (122/65 - 154/72)  RR: 18 (12-05-22 @ 05:10) (17 - 18)  SpO2: 99% (12-05-22 @ 05:10) (97% - 100%)  Wt(kg): --  CAPILLARY BLOOD GLUCOSE      POCT Blood Glucose.: 267 mg/dL (04 Dec 2022 09:02)      Labs                          13.8   7.94  )-----------( 371      ( 04 Dec 2022 04:43 )             42.7       12-05    144  |  106  |  21<H>  ----------------------------<  113<H>  3.6   |  30  |  0.83    Ca    9.7      05 Dec 2022 04:05  Phos  3.1     12-05  Mg     2.0     12-05    TPro  6.8  /  Alb  3.3<L>  /  TBili  0.6  /  DBili  x   /  AST  12  /  ALT  18  /  AlkPhos  81  12-05          Troponin I, High Sensitivity Result: 6.6 ng/L (12-04-22 @ 04:43)    .Blood Blood-Peripheral  10-14 @ 16:50   No Growth Final  --  --      Clean Catch Clean Catch (Midstream)  10-14 @ 16:45   No growth  --  --      .Blood Blood-Peripheral  10-14 @ 16:40   No Growth Final  --  --          Radiology Results      Meds    MEDICATIONS  (STANDING):  aspirin  chewable 81 milliGRAM(s) Oral daily  atorvastatin 40 milliGRAM(s) Oral at bedtime  enoxaparin Injectable 40 milliGRAM(s) SubCutaneous every 24 hours  lisinopril 10 milliGRAM(s) Oral daily      MEDICATIONS  (PRN):      Physical Exam    Neuro :  no focal deficits  Respiratory: CTA B/L  CV: RRR, S1S2, no murmurs,   Abdominal: Soft, NT, ND +BS,  Extremities: No edema, + peripheral pulses    ASSESSMENT    dizziness,   r/o cns patho,   r/o arrythmia,   h/o HTN    Dizziness and giddiness    HTN (hypertension)    No significant past surgical history        PLAN    adm to tele,   aspirin,   statin,   cont preadmit home meds,   gi and dvt prophylaxis  cbc,   bmp,   mg,   phos,   lipid,   tsh,   ce q8 x3,   ua,   vit b12,     orthostatic bp    echo    cardio cons  neuro cons.     Patient is a 72y old  Female who presents with a chief complaint of Dizziness (04 Dec 2022 13:28)    pt seen in ed tele [ x ], reg med floor [   ], bed [ x ], chair at bedside [   ], a+o x3 [x  ], lethargic [  ],  nad [ x ]      Allergies    No Known Allergies        Vitals    T(F): 98.3 (12-05-22 @ 05:10), Max: 98.3 (12-04-22 @ 20:56)  HR: 76 (12-05-22 @ 05:10) (76 - 109)  BP: 125/61 (12-05-22 @ 05:10) (122/65 - 154/72)  RR: 18 (12-05-22 @ 05:10) (17 - 18)  SpO2: 99% (12-05-22 @ 05:10) (97% - 100%)  Wt(kg): --  CAPILLARY BLOOD GLUCOSE      POCT Blood Glucose.: 267 mg/dL (04 Dec 2022 09:02)      Labs                          13.8   7.94  )-----------( 371      ( 04 Dec 2022 04:43 )             42.7       12-05    144  |  106  |  21<H>  ----------------------------<  113<H>  3.6   |  30  |  0.83    Ca    9.7      05 Dec 2022 04:05  Phos  3.1     12-05  Mg     2.0     12-05    TPro  6.8  /  Alb  3.3<L>  /  TBili  0.6  /  DBili  x   /  AST  12  /  ALT  18  /  AlkPhos  81  12-05    Vitamin B12, Serum in AM (12.05.22 @ 04:05)   Vitamin B12, Serum: <150    Thyroid Stimulating Hormone, Serum in AM (12.05.22 @ 04:05)   Thyroid Stimulating Hormone, Serum: 0.19 uU/mL     Troponin I, High Sensitivity Result: 6.6 ng/L (12-04-22 @ 04:43)      Radiology Results      Meds    MEDICATIONS  (STANDING):  aspirin  chewable 81 milliGRAM(s) Oral daily  atorvastatin 40 milliGRAM(s) Oral at bedtime  enoxaparin Injectable 40 milliGRAM(s) SubCutaneous every 24 hours  lisinopril 10 milliGRAM(s) Oral daily      MEDICATIONS  (PRN):      Physical Exam    Neuro :  no focal deficits  Respiratory: CTA B/L  CV: RRR, S1S2, no murmurs,   Abdominal: Soft, NT, ND +BS,  Extremities: No edema, + peripheral pulses    ASSESSMENT    dizziness,   r/o cns patho,   r/o arrythmia,   vit b12 defficiency  h/o HTN      PLAN    cont tele,   aspirin, statin,   neuro cons   orthostatic bp q shift   trop x1 neg noted above  cardio cons  f/u echo   vit b12 low noted above  start vit b12 1000mcg sq daily x 7 days  f/u intrinsic factor   tsh low noted above  f/u free t4  endo cons   cont current meds

## 2022-12-05 NOTE — PROGRESS NOTE ADULT - SUBJECTIVE AND OBJECTIVE BOX
NP Note discussed with  Primary Attending    Patient is a 72y old  Female who presents with a chief complaint of Dizziness (05 Dec 2022 12:24)      INTERVAL HPI/OVERNIGHT EVENTS: Patient seen and examined at bedside. Patient Swedish speaking ID #[id number].    MEDICATIONS  (STANDING):  aspirin  chewable 81 milliGRAM(s) Oral daily  atorvastatin 40 milliGRAM(s) Oral at bedtime  cyanocobalamin Injectable 1000 MICROGram(s) SubCutaneous daily  enoxaparin Injectable 40 milliGRAM(s) SubCutaneous every 24 hours  lisinopril 10 milliGRAM(s) Oral daily    MEDICATIONS  (PRN):      __________________________________________________  REVIEW OF SYSTEMS:    CONSTITUTIONAL: No fever,   EYES: no acute visual disturbances  NECK: No pain or stiffness  RESPIRATORY: No cough; No shortness of breath  CARDIOVASCULAR: No chest pain, no palpitations  GASTROINTESTINAL: No pain. No nausea or vomiting; No diarrhea   NEUROLOGICAL: improved dizziness  MUSCULOSKELETAL: No joint pain, no muscle pain  GENITOURINARY: no dysuria, no frequency, no hesitancy  PSYCHIATRY: no depression , no anxiety  ALL OTHER  ROS negative        Vital Signs Last 24 Hrs  T(C): 36.9 (05 Dec 2022 08:01), Max: 36.9 (05 Dec 2022 08:01)  T(F): 98.5 (05 Dec 2022 08:01), Max: 98.5 (05 Dec 2022 08:01)  HR: 85 (05 Dec 2022 08:01) (76 - 97)  BP: 151/74 (05 Dec 2022 08:01) (122/65 - 154/72)  BP(mean): --  RR: 18 (05 Dec 2022 08:01) (17 - 18)  SpO2: 98% (05 Dec 2022 08:01) (98% - 100%)    Parameters below as of 05 Dec 2022 05:10  Patient On (Oxygen Delivery Method): room air        ________________________________________________  PHYSICAL EXAM:  GENERAL: NAD  HEENT: Normocephalic;  conjunctivae and sclerae clear; moist mucous membranes;   NECK : supple  CHEST/LUNG: Clear to auscultation bilaterally with good air entry   HEART: S1 S2  regular; no murmurs, gallops or rubs  ABDOMEN: Soft, Nontender, Nondistended; Bowel sounds present  EXTREMITIES: no cyanosis; no edema; no calf tenderness  SKIN: warm and dry; no rash  NERVOUS SYSTEM:  Awake and alert; Oriented  to place, person and time ; no new deficits    _________________________________________________  LABS:                        13.0   8.47  )-----------( 374      ( 05 Dec 2022 04:05 )             40.6     12-05    144  |  106  |  21<H>  ----------------------------<  113<H>  3.6   |  30  |  0.83    Ca    9.7      05 Dec 2022 04:05  Phos  3.1     12-  Mg     2.0     12-    TPro  6.8  /  Alb  3.3<L>  /  TBili  0.6  /  DBili  x   /  AST  12  /  ALT  18  /  AlkPhos  81  12-05    PT/INR - ( 04 Dec 2022 04:43 )   PT: 11.2 sec;   INR: 0.94 ratio         PTT - ( 04 Dec 2022 04:43 )  PTT:24.8 sec  Urinalysis Basic - ( 04 Dec 2022 08:59 )    Color: Yellow / Appearance: Clear / S.020 / pH: x  Gluc: x / Ketone: Negative  / Bili: Negative / Urobili: Negative   Blood: x / Protein: Negative / Nitrite: Negative   Leuk Esterase: Negative / RBC: 0-2 /HPF / WBC 0-2 /HPF   Sq Epi: x / Non Sq Epi: Many /HPF / Bacteria: Many /HPF      CAPILLARY BLOOD GLUCOSE    RADIOLOGY & ADDITIONAL TESTS:  < from: CT Angio Neck w/ IV Cont (22 @ 09:35) >  TECHNIQUE:      CTA [place]:    After the intravenous power injection of non-ionic contrast material,   serial thin sections were obtained through the intracranial circulation   on a multislice CT scanner.  Images were reformatted using a dedicated 3D   software package and viewed on a dedicated workstation in multiple   planes. MIP image analysis was performed on a separate workstation.    CTA NECK:    After the intravenous power injection of non-ionic contrast material,   serial thin sections were obtained through the cervical circulation on a   multislice CT scanner.  Images were reformatted using a dedicated 3D   software package and viewed on a dedicated workstation in multiple   planes. MIP image analysis was performed on a separate workstation.    CONTRAST: 110 mL Omnipaque 350 was administered. 90 mL was discarded.      COMPARISON EXAMINATION: CT head from same day.    FINDINGS:        CT RAPID PERFUSION:    INFARCT CORE: 0 mL    TISSUE AT RISK: 0 mL    MISMATCH RATIO: None.      CTA [place]:    ANTERIOR CIRCULATION    ICA  CAVERNOUS, SUPRACLINOID, BIFURCATION SEGMENTS: Patent without flow   limiting stenosis. The right posterior communicating arteries present.    ANTERIOR CEREBRAL ARTERIES: Bilateral A1, anterior communicating and A2   anterior cerebral arteries are unremarkable in course and caliber without   flow limiting stenosis.    MIDDLE CEREBRAL ARTERIES: Patent bilateral M1, M2, and distal MCA   branches without flow limiting stenosis.    POSTERIOR CIRCULATION:    VERTEBRAL ARTERIES: Patent without flow limiting stenosis    BASILAR ARTERY: Patent no flow limiting stenosis.    POSTERIOR CEREBRAL ARTERIES: Fetal origin of the right posterior cerebral   artery. Patent without flow limiting stenosis.    CTA NECK:    GREAT VESSELS: Visualized segments are patent, no flow limiting stenosis.    COMMON CAROTID ARTERIES:  RIGHT: Patent without flow limiting stenosis  LEFT: Patent without flow limiting stenosis    INTERNAL CAROTID ARTERIES:  RIGHT: Patent no evidence for any hemodynamically significant stenosis at   the ICA origin by NASCET criteria.  LEFT: Patent no evidence for any hemodynamically significant stenosis at   the ICA origin by NASCET criteria.    VERTEBRAL ARTERIES:    RIGHT: Patent no evidence for any flow limiting stenosis.  LEFT: Patent no evidence for any flow limiting stenosis.      SOFT TISSUES: Unremarkable    BONES: Unremarkable    IMPRESSION:    CT PERFUSION demonstrated: No asymmetric or territorial perfusion   abnormality.    If symptoms persist consider follow up head CT or MRI, MRA  if no   contraindication.    CTA COW:  Patent intracranial circulation without flow limiting stenosis   or large vessel occlusion.    CTA NECK: Patent, ECAs, ICAs, no  hemodynamically significant stenosis at    ICA origins by NASCET criteria.  Bilateral vertebral arteries are patent without flow limiting stenosis.    --- End of Report ---    < end of copied text >    Imaging  Reviewed:  YES    Consultant(s) Notes Reviewed:   YES      Plan of care was discussed with patient and /or primary care giver; all questions and concerns were addressed

## 2022-12-05 NOTE — SWALLOW BEDSIDE ASSESSMENT ADULT - SWALLOW EVAL: DIAGNOSIS
Pt p/w oral and pharyngeal phases of swallow WFL. Oral phase appeared unremarkable and functional for soft & regular solids. Pharyngeal phase appeared timely & efficient; however, slightly reduced hyolaryngeal elevation was noted. Overt s&s of aspiration/penetration observed AEB coughing following one PO trial of thin liquids with a larger presentation size, likely an isolated event. No overt s&s of aspiration/penetration observed w/ PO intake of soft & regular solids, or small sips of thin liquids.

## 2022-12-05 NOTE — PROGRESS NOTE ADULT - PROBLEM SELECTOR PLAN 1
posterior circulation transient ischemic attack vs. ischemic stroke vs. gastrointestinal infection  CT head showing possible thrombus in Left ICA no acute hemorrhage, normal CTA head and neck   can be 2/2 to posterior CVA vs infectious causes vs orthostasis   EKG NSR, normal troponin level   continue aspirin and statin   continue telemetry monitoring   f/u echo with bubble study  f/u MR brain  pending speech and swallow- no complain of difficulty swallowing  Neurology Dr. Joel   Cardiology Dr. Gibson

## 2022-12-05 NOTE — SWALLOW BEDSIDE ASSESSMENT ADULT - COMMENTS
Overt s&s of aspiration/penetration observed AEB dry cough following one PO trial of thin liquids with a larger presentation size, likely an isolated event. However, no overt s&s of aspiration/penetration observed w/ subsequent trials. Pt seen for subjective bedside swallow evaluation. Pt's daughter present at bedside. Pt received OOB, seated upright at the edge of the bed. Exam was given in Greek via , VRI#[id number]. A+Ox3; cooperative for PO trials. Pt denied any difficulty swallowing; endorsed being able to consume a regular diet. PO trials included: soft & bite-sized, regular solids, & thin liquids.

## 2022-12-05 NOTE — PATIENT PROFILE ADULT - FALL HARM RISK - HARM RISK INTERVENTIONS
Assistance with ambulation/Assistance OOB with selected safe patient handling equipment/Communicate Risk of Fall with Harm to all staff/Discuss with provider need for PT consult/Monitor gait and stability/Provide patient with walking aids - walker, cane, crutches/Reinforce activity limits and safety measures with patient and family/Sit up slowly, dangle for a short time, stand at bedside before walking/Tailored Fall Risk Interventions/Visual Cue: Yellow wristband and red socks/Bed in lowest position, wheels locked, appropriate side rails in place/Call bell, personal items and telephone in reach/Instruct patient to call for assistance before getting out of bed or chair/Non-slip footwear when patient is out of bed/[place] to call system/Physically safe environment - no spills, clutter or unnecessary equipment/Purposeful Proactive Rounding/Room/bathroom lighting operational, light cord in reach

## 2022-12-05 NOTE — CONSULT NOTE ADULT - ASSESSMENT
73yo female with hx of HTN (not noted to be on medication), presents with dizziness,vit b12 def.  1.Tele monitoring.  2.Neurology eval.  3.HTN-ace.  4.Vitamin b12 replacement.  5.Lipid d/o-statin.  6.Orthostatic bp.  7.GI and DVT prophylaxis.

## 2022-12-06 LAB
ALBUMIN SERPL ELPH-MCNC: 3 G/DL — LOW (ref 3.5–5)
ALP SERPL-CCNC: 79 U/L — SIGNIFICANT CHANGE UP (ref 40–120)
ALT FLD-CCNC: 17 U/L DA — SIGNIFICANT CHANGE UP (ref 10–60)
ANION GAP SERPL CALC-SCNC: 9 MMOL/L — SIGNIFICANT CHANGE UP (ref 5–17)
AST SERPL-CCNC: 12 U/L — SIGNIFICANT CHANGE UP (ref 10–40)
BILIRUB SERPL-MCNC: 0.8 MG/DL — SIGNIFICANT CHANGE UP (ref 0.2–1.2)
BUN SERPL-MCNC: 20 MG/DL — HIGH (ref 7–18)
CALCIUM SERPL-MCNC: 10 MG/DL — SIGNIFICANT CHANGE UP (ref 8.4–10.5)
CHLORIDE SERPL-SCNC: 107 MMOL/L — SIGNIFICANT CHANGE UP (ref 96–108)
CO2 SERPL-SCNC: 28 MMOL/L — SIGNIFICANT CHANGE UP (ref 22–31)
CREAT SERPL-MCNC: 0.79 MG/DL — SIGNIFICANT CHANGE UP (ref 0.5–1.3)
EGFR: 80 ML/MIN/1.73M2 — SIGNIFICANT CHANGE UP
GLUCOSE BLDC GLUCOMTR-MCNC: 102 MG/DL — HIGH (ref 70–99)
GLUCOSE BLDC GLUCOMTR-MCNC: 106 MG/DL — HIGH (ref 70–99)
GLUCOSE BLDC GLUCOMTR-MCNC: 114 MG/DL — HIGH (ref 70–99)
GLUCOSE BLDC GLUCOMTR-MCNC: 134 MG/DL — HIGH (ref 70–99)
GLUCOSE SERPL-MCNC: 114 MG/DL — HIGH (ref 70–99)
HCT VFR BLD CALC: 39.5 % — SIGNIFICANT CHANGE UP (ref 34.5–45)
HGB BLD-MCNC: 12.6 G/DL — SIGNIFICANT CHANGE UP (ref 11.5–15.5)
MAGNESIUM SERPL-MCNC: 2 MG/DL — SIGNIFICANT CHANGE UP (ref 1.6–2.6)
MCHC RBC-ENTMCNC: 28.7 PG — SIGNIFICANT CHANGE UP (ref 27–34)
MCHC RBC-ENTMCNC: 31.9 GM/DL — LOW (ref 32–36)
MCV RBC AUTO: 90 FL — SIGNIFICANT CHANGE UP (ref 80–100)
NRBC # BLD: 0 /100 WBCS — SIGNIFICANT CHANGE UP (ref 0–0)
PHOSPHATE SERPL-MCNC: 3.2 MG/DL — SIGNIFICANT CHANGE UP (ref 2.5–4.5)
PLATELET # BLD AUTO: 335 K/UL — SIGNIFICANT CHANGE UP (ref 150–400)
POTASSIUM SERPL-MCNC: 3.4 MMOL/L — LOW (ref 3.5–5.3)
POTASSIUM SERPL-SCNC: 3.4 MMOL/L — LOW (ref 3.5–5.3)
PROT SERPL-MCNC: 6.7 G/DL — SIGNIFICANT CHANGE UP (ref 6–8.3)
RBC # BLD: 4.39 M/UL — SIGNIFICANT CHANGE UP (ref 3.8–5.2)
RBC # FLD: 13.2 % — SIGNIFICANT CHANGE UP (ref 10.3–14.5)
SODIUM SERPL-SCNC: 144 MMOL/L — SIGNIFICANT CHANGE UP (ref 135–145)
WBC # BLD: 6.66 K/UL — SIGNIFICANT CHANGE UP (ref 3.8–10.5)
WBC # FLD AUTO: 6.66 K/UL — SIGNIFICANT CHANGE UP (ref 3.8–10.5)

## 2022-12-06 PROCEDURE — 99233 SBSQ HOSP IP/OBS HIGH 50: CPT

## 2022-12-06 RX ORDER — ATORVASTATIN CALCIUM 80 MG/1
1 TABLET, FILM COATED ORAL
Qty: 0 | Refills: 0 | DISCHARGE
Start: 2022-12-06

## 2022-12-06 RX ORDER — ASPIRIN/CALCIUM CARB/MAGNESIUM 324 MG
1 TABLET ORAL
Qty: 0 | Refills: 0 | DISCHARGE
Start: 2022-12-06

## 2022-12-06 RX ORDER — POTASSIUM CHLORIDE 20 MEQ
40 PACKET (EA) ORAL ONCE
Refills: 0 | Status: COMPLETED | OUTPATIENT
Start: 2022-12-06 | End: 2022-12-06

## 2022-12-06 RX ADMIN — ATORVASTATIN CALCIUM 40 MILLIGRAM(S): 80 TABLET, FILM COATED ORAL at 21:46

## 2022-12-06 RX ADMIN — PREGABALIN 1000 MICROGRAM(S): 225 CAPSULE ORAL at 13:54

## 2022-12-06 RX ADMIN — LISINOPRIL 10 MILLIGRAM(S): 2.5 TABLET ORAL at 06:42

## 2022-12-06 RX ADMIN — ENOXAPARIN SODIUM 40 MILLIGRAM(S): 100 INJECTION SUBCUTANEOUS at 21:42

## 2022-12-06 RX ADMIN — Medication 40 MILLIEQUIVALENT(S): at 11:04

## 2022-12-06 RX ADMIN — Medication 81 MILLIGRAM(S): at 13:54

## 2022-12-06 RX ADMIN — Medication 50 MILLIGRAM(S): at 17:54

## 2022-12-06 RX ADMIN — Medication 50 MILLIGRAM(S): at 06:42

## 2022-12-06 NOTE — DISCHARGE NOTE PROVIDER - NSDCCPCAREPLAN_GEN_ALL_CORE_FT
PRINCIPAL DISCHARGE DIAGNOSIS  Diagnosis: Acute CVA (cerebrovascular accident)  Assessment and Plan of Treatment: You came complaining of dizziness. You were diagnosed with CVA cerebro vascular disease.   Initial CT head findings suggestive of an acute stroke. Follow up CTA head and neck, CT perfusion were both noted to be normal. You were admited to the telemetry floor. Your MRI of the brain was negative. EKG normal, Echocardiogram showed normal pumping function of the heart and no significant valvular abnormalities.   You were seen by Neurologist who recommends treatment with ASPIRIN, ATORVASTATIN FOR 21 DAYS. You were seen by physical therapist who [facility].  Please take medications as prescribed and follow up with your PCP and Neurologist in 1 week from discharge for further recommendations.      SECONDARY DISCHARGE DIAGNOSES  Diagnosis: HTN (hypertension)  Assessment and Plan of Treatment: You have a history of Hypertension.   On this admission, your Blood Pressure was adequately controlled with lisinopril and metoprolol.   Your blood pressure target is 120-140/80-90, maintain healthy lifestyle, low salt diet, avoid fatty food, weight loss, exercise regularly or stay active as tolerated 30 mins X 3 times per week.  Notify your doctor if you have any of the following symptoms:   (Dizziness, Lightheadedness, Blurry vision, Headache, Chest pain, Shortness of breath.)  Please continue taking your home medications and follow-up with your PCP in 1 week from discharge to adjust medications as needed.     PRINCIPAL DISCHARGE DIAGNOSIS  Diagnosis: Acute CVA (cerebrovascular accident)  Assessment and Plan of Treatment: You came complaining of dizziness. You were diagnosed with CVA cerebro vascular disease.   Initial CT head findings suggestive of an acute stroke. Follow up CTA head and neck, CT perfusion were both noted to be normal. You were admited to the telemetry floor. Your MRI of the brain was negative. EKG normal, Echocardiogram showed normal pumping function of the heart and no significant valvular abnormalities.   You were seen by Neurologist who recommends treatment with ASPIRIN, ATORVASTATIN FOR 21 DAYS. You were seen by physical therapist who recommended [facility].  Please take medications as prescribed and follow up with your PCP and Neurologist in 1 week from discharge for further recommendations.      SECONDARY DISCHARGE DIAGNOSES  Diagnosis: HTN (hypertension)  Assessment and Plan of Treatment: You have a history of Hypertension.   On this admission, your Blood Pressure was adequately controlled with lisinopril and metoprolol.   Your blood pressure target is 120-140/80-90, maintain healthy lifestyle, low salt diet, avoid fatty food, weight loss, exercise regularly or stay active as tolerated 30 mins X 3 times per week.  Notify your doctor if you have any of the following symptoms:   (Dizziness, Lightheadedness, Blurry vision, Headache, Chest pain, Shortness of breath.)  Please continue taking your home medications and follow-up with your PCP in 1 week from discharge to adjust medications as needed.    Diagnosis: Vitamin B12 deficiency  Assessment and Plan of Treatment: Your vitamin B12 levels are low. You are given Vitamin b12 shots during your hospitalization. You will need to take 4 more days of vitamin b 12 shots. You were instructed how to provide b12 shots during your hospitalization. After 4 days of daily shots, you will give yourself weekly shots for 4 weeks. In total, you will administer 8 shots to yourself. Please follow up with your primary care physician in one week to inform them of your recent hospitalization and further management of your medical conditions.

## 2022-12-06 NOTE — DISCHARGE NOTE PROVIDER - CARE PROVIDERS DIRECT ADDRESSES
,DirectAddress_Unknown,[email or web address]

## 2022-12-06 NOTE — DISCHARGE NOTE PROVIDER - HOSPITAL COURSE
Patient is a Andorran speaking 73yo female with hx of HTN (not noted to be on medication), presents with dizziness. Pt. reports specifically dizziness upon standing. Patient states that she has been having the dizziness that started 12/3 and has gotten worse throughout the night so she decided to come to the hospital. Patient denied any chest pain, syncope, LE swelling or palpitations. Patient denies any prior episodes. In ED patient was given meclizine with no resolution of symptoms. CT head showing findings suggestive of Left ICA thrombus, and afterwards Code Stroke was called. CTA head and neck, CT perfusion were both noted to be normal. Patient admitted to telemetry further evaluation of dizziness r/o CVA. MRI of the brain was negative. Pt's orthostatic vital signs were negative. Pt's echo on 12/5 was negative, with inconclusive bubble study. Pt. had no acute events on telemetry. Pt. was placed on asa, statin for secondary stroke prevention.  Pt. was on lovenox for dvt ppx. Pt. had no reoccurring symptoms such as dizziness.   Patient is stable for discharge per attending and is advised to follow up with PCP as outpatient  Please refer to patient's complete medical chart with documents for a full hospital course, for this is only a brief summary.

## 2022-12-06 NOTE — DISCHARGE NOTE PROVIDER - NSDCMRMEDTOKEN_GEN_ALL_CORE_FT
aspirin 81 mg oral tablet, chewable: 1 tab(s) orally once a day  atorvastatin 40 mg oral tablet: 1 tab(s) orally once a day (at bedtime)  bisoprolol 5 mg oral tablet: 1 tab(s) orally once a day  ramipril 2.5 mg oral tablet: 1 tab(s) orally once a day   aspirin 81 mg oral tablet, chewable: 1 tab(s) orally once a day  atorvastatin 40 mg oral tablet: 1 tab(s) orally once a day (at bedtime)  bisoprolol 5 mg oral tablet: 1 tab(s) orally once a day  cyanocobalamin 1000 mcg/mL injectable solution: 1000 microgram(s) intramuscularly once a day for 4 more days. Then once a week for 4 weeks.  ramipril 2.5 mg oral tablet: 1 tab(s) orally once a day

## 2022-12-06 NOTE — PROGRESS NOTE ADULT - TIME BILLING
I counseled the patient and primary team about the patient's likely diagnoses, as well as the medications to maintain to help prevent recurrence of symptoms.

## 2022-12-06 NOTE — PROGRESS NOTE ADULT - SUBJECTIVE AND OBJECTIVE BOX
PGY-1 Progress Note discussed with attending    PAGER #: [[phone]] TILL 5:00 PM  PLEASE CONTACT ON CALL TEAM:  - On Call Team (Please refer to Tarah) FROM 5:00 PM - 8:30PM  - Nightfloat Team FROM 8:30 -7:30 AM    CHIEF COMPLAINT & BRIEF HOSPITAL COURSE:    INTERVAL HPI/OVERNIGHT EVENTS:   MEDICATIONS  (STANDING):  aspirin  chewable 81 milliGRAM(s) Oral daily  atorvastatin 40 milliGRAM(s) Oral at bedtime  cyanocobalamin Injectable 1000 MICROGram(s) SubCutaneous daily  enoxaparin Injectable 40 milliGRAM(s) SubCutaneous every 24 hours  lisinopril 10 milliGRAM(s) Oral daily  metoprolol tartrate 50 milliGRAM(s) Oral two times a day    MEDICATIONS  (PRN):      REVIEW OF SYSTEMS:  CONSTITUTIONAL: No fever, weight loss, or fatigue  RESPIRATORY: No cough, wheezing, chills or hemoptysis; No shortness of breath  CARDIOVASCULAR: No chest pain, palpitations, dizziness, or leg swelling  GASTROINTESTINAL: No abdominal pain. No nausea, vomiting, or hematemesis; No diarrhea or constipation. No melena or hematochezia.  GENITOURINARY: No dysuria or hematuria, urinary frequency  NEUROLOGICAL: No headaches, memory loss, loss of strength, numbness, or tremors  SKIN: No itching, burning, rashes, or lesions     Vital Signs Last 24 Hrs  T(C): 36.8 (06 Dec 2022 10:39), Max: 36.9 (05 Dec 2022 15:38)  T(F): 98.3 (06 Dec 2022 10:39), Max: 98.5 (05 Dec 2022 15:38)  HR: 81 (06 Dec 2022 10:39) (75 - 100)  BP: 135/75 (06 Dec 2022 10:39) (129/75 - 153/83)  BP(mean): --  RR: 18 (06 Dec 2022 10:39) (17 - 18)  SpO2: 93% (06 Dec 2022 10:39) (93% - 98%)    Parameters below as of 06 Dec 2022 10:39  Patient On (Oxygen Delivery Method): room air        PHYSICAL EXAMINATION:  GENERAL: NAD, well built  HEAD:  Atraumatic, Normocephalic  EYES:  conjunctiva and sclera clear  NECK: Supple, No JVD, Normal thyroid  CHEST/LUNG: Clear to auscultation. Clear to percussion bilaterally; No rales, rhonchi, wheezing, or rubs  HEART: Regular rate and rhythm; No murmurs, rubs, or gallops  ABDOMEN: Soft, Nontender, Nondistended; Bowel sounds present  NERVOUS SYSTEM:  Alert & Oriented X3,    EXTREMITIES:  2+ Peripheral Pulses, No clubbing, cyanosis, or edema  SKIN: warm dry                          12.6   6.66  )-----------( 335      ( 06 Dec 2022 06:40 )             39.5     12-06    144  |  107  |  20<H>  ----------------------------<  114<H>  3.4<L>   |  28  |  0.79    Ca    10.0      06 Dec 2022 06:40  Phos  3.2     12-06  Mg     2.0     12-06    TPro  6.7  /  Alb  3.0<L>  /  TBili  0.8  /  DBili  x   /  AST  12  /  ALT  17  /  AlkPhos  79  12-06    LIVER FUNCTIONS - ( 06 Dec 2022 06:40 )  Alb: 3.0 g/dL / Pro: 6.7 g/dL / ALK PHOS: 79 U/L / ALT: 17 U/L DA / AST: 12 U/L / GGT: x                   CAPILLARY BLOOD GLUCOSE      RADIOLOGY & ADDITIONAL TESTS:                   PGY-1 Progress Note discussed with attending    PLEASE CONTACT ON CALL TEAM:  - On Call Team (Please refer to Tarah) FROM 5:00 PM - 8:30PM  - Nightfloat Team FROM 8:30 -7:30 AM    INTERVAL HPI/OVERNIGHT EVENTS:     Pt. A&Ox3, no acute events overnight. Pt. currently not complaining of any dizziness symptoms, able to ambulate independently without assistance.     MEDICATIONS  (STANDING):  aspirin  chewable 81 milliGRAM(s) Oral daily  atorvastatin 40 milliGRAM(s) Oral at bedtime  cyanocobalamin Injectable 1000 MICROGram(s) SubCutaneous daily  enoxaparin Injectable 40 milliGRAM(s) SubCutaneous every 24 hours  lisinopril 10 milliGRAM(s) Oral daily  metoprolol tartrate 50 milliGRAM(s) Oral two times a day    MEDICATIONS  (PRN):      REVIEW OF SYSTEMS:  CONSTITUTIONAL: No fever, weight loss, or fatigue  RESPIRATORY: No cough, wheezing, chills or hemoptysis; No shortness of breath  CARDIOVASCULAR: No chest pain, palpitations, dizziness, or leg swelling  GASTROINTESTINAL: No abdominal pain. No nausea, vomiting, or hematemesis; No diarrhea or constipation. No melena or hematochezia.  GENITOURINARY: No dysuria or hematuria, urinary frequency  NEUROLOGICAL: No headaches, memory loss, loss of strength, numbness, or tremors  SKIN: No itching, burning, rashes, or lesions     Vital Signs Last 24 Hrs  T(C): 36.8 (06 Dec 2022 10:39), Max: 36.9 (05 Dec 2022 15:38)  T(F): 98.3 (06 Dec 2022 10:39), Max: 98.5 (05 Dec 2022 15:38)  HR: 81 (06 Dec 2022 10:39) (75 - 100)  BP: 135/75 (06 Dec 2022 10:39) (129/75 - 153/83)  BP(mean): --  RR: 18 (06 Dec 2022 10:39) (17 - 18)  SpO2: 93% (06 Dec 2022 10:39) (93% - 98%)    Parameters below as of 06 Dec 2022 10:39  Patient On (Oxygen Delivery Method): room air        PHYSICAL EXAMINATION:  GENERAL: NAD, well built  HEAD:  Atraumatic, Normocephalic  EYES:  conjunctiva and sclera clear  NECK: Supple, No JVD, Normal thyroid  CHEST/LUNG: Clear to auscultation. Clear to percussion bilaterally; No rales, rhonchi, wheezing, or rubs  HEART: Regular rate and rhythm; No murmurs, rubs, or gallops  ABDOMEN: Soft, Nontender, Nondistended; Bowel sounds present  NERVOUS SYSTEM:  Alert & Oriented X3,  no focal neuro deficits, sensation and muscle strength intact b/l  EXTREMITIES:  2+ Peripheral Pulses, No clubbing, cyanosis, or edema  SKIN: warm dry                          12.6   6.66  )-----------( 335      ( 06 Dec 2022 06:40 )             39.5     12-06    144  |  107  |  20<H>  ----------------------------<  114<H>  3.4<L>   |  28  |  0.79    Ca    10.0      06 Dec 2022 06:40  Phos  3.2     12-06  Mg     2.0     12-06    TPro  6.7  /  Alb  3.0<L>  /  TBili  0.8  /  DBili  x   /  AST  12  /  ALT  17  /  AlkPhos  79  12-06    LIVER FUNCTIONS - ( 06 Dec 2022 06:40 )  Alb: 3.0 g/dL / Pro: 6.7 g/dL / ALK PHOS: 79 U/L / ALT: 17 U/L DA / AST: 12 U/L / GGT: x                   CAPILLARY BLOOD GLUCOSE      RADIOLOGY & ADDITIONAL TESTS:

## 2022-12-06 NOTE — PHYSICAL THERAPY INITIAL EVALUATION ADULT - CRITERIA FOR SKILLED THERAPEUTIC INTERVENTIONS
anticipated discharge recommendation Pt. presents without gross impairment and is independent with functional mobility. Skilled therapeutic PT intervention not indicated at this time. Pt. will not be placed on PT program./anticipated discharge recommendation

## 2022-12-06 NOTE — DISCHARGE NOTE PROVIDER - CARE PROVIDER_API CALL
Ruddy Ross)  Internal Medicine  [phone] [place] [id number]  Phone: [phone]  Fax: [phone]  Follow Up Time:     Hever Joel)  Neurology  Epilepsy  [address] [place] [id number]  Phone: [phone]  Fax: ()-  Follow Up Time: 2 weeks

## 2022-12-06 NOTE — PROGRESS NOTE ADULT - PROBLEM SELECTOR PLAN 1
posterior circulation transient ischemic attack vs. ischemic stroke vs. gastrointestinal infection  CT head showing possible thrombus in Left ICA no acute hemorrhage, normal CTA head and neck   can be 2/2 to posterior CVA vs infectious causes vs orthostasis   EKG NSR, normal troponin level   continue aspirin and statin   continue telemetry monitoring   f/u echo with bubble study  f/u MR brain  pending speech and swallow- no complain of difficulty swallowing  Neurology Dr. Joel   Cardiology Dr. Gibson posterior circulation transient ischemic attack vs. ischemic stroke vs. gastrointestinal infection  CT head showing possible thrombus in Left ICA no acute hemorrhage, normal CTA head and neck   can be 2/2 to posterior CVA vs infectious causes vs orthostasis   EKG NSR, normal troponin level   continue aspirin and statin   continue telemetry monitoring   Echo- negative, bubble study inconclusive  MR brain- negative for acute hemorrhage, stroke  Neurology Dr. Joel   Cardiology Dr. Gibson

## 2022-12-06 NOTE — PROGRESS NOTE ADULT - SUBJECTIVE AND OBJECTIVE BOX
CHIEF COMPLAINT:Patient is a 72y old  Female who presents with a chief complaint of Dizziness.Pt appears comfortable.    	  REVIEW OF SYSTEMS:  CONSTITUTIONAL: No fever, weight loss, or fatigue  EYES: No eye pain, visual disturbances, or discharge  ENT:  No difficulty hearing, tinnitus, vertigo; No sinus or throat pain  NECK: No pain or stiffness  RESPIRATORY: No cough, wheezing, chills or hemoptysis; No Shortness of Breath  CARDIOVASCULAR: No chest pain, palpitations, passing out, dizziness, or leg swelling  GASTROINTESTINAL: No abdominal or epigastric pain. No nausea, vomiting, or hematemesis; No diarrhea or constipation. No melena or hematochezia.  GENITOURINARY: No dysuria, frequency, hematuria, or incontinence  NEUROLOGICAL: No headaches, memory loss, loss of strength, numbness, or tremors  SKIN: No itching, burning, rashes, or lesions   LYMPH Nodes: No enlarged glands  ENDOCRINE: No heat or cold intolerance; No hair loss  MUSCULOSKELETAL: No joint pain or swelling; No muscle, back, or extremity pain  PSYCHIATRIC: No depression, anxiety, mood swings, or difficulty sleeping  HEME/LYMPH: No easy bruising, or bleeding gums  ALLERGY AND IMMUNOLOGIC: No hives or eczema	      PHYSICAL EXAM:  T(C): 36.7 (12-06-22 @ 04:42), Max: 36.9 (12-05-22 @ 15:38)  HR: 86 (12-06-22 @ 10:08) (75 - 100)  BP: 129/75 (12-06-22 @ 10:08) (129/75 - 153/83)  RR: 18 (12-06-22 @ 04:42) (17 - 18)  SpO2: 97% (12-06-22 @ 10:08) (96% - 98%)  Wt(kg): --  I&O's Summary      Appearance: Normal	  HEENT:   Normal oral mucosa, PERRL, EOMI	  Lymphatic: No lymphadenopathy  Cardiovascular: Normal S1 S2, No JVD, No murmurs, No edema  Respiratory: Lungs clear to auscultation	  Psychiatry: A & O x 3, Mood & affect appropriate  Gastrointestinal:  Soft, Non-tender, + BS	  Skin: No rashes, No ecchymoses, No cyanosis	  Neurologic: Non-focal  Extremities: Normal range of motion, No clubbing, cyanosis or edema  Vascular: Peripheral pulses palpable 2+ bilaterally    MEDICATIONS  (STANDING):  aspirin  chewable 81 milliGRAM(s) Oral daily  atorvastatin 40 milliGRAM(s) Oral at bedtime  cyanocobalamin Injectable 1000 MICROGram(s) SubCutaneous daily  enoxaparin Injectable 40 milliGRAM(s) SubCutaneous every 24 hours  lisinopril 10 milliGRAM(s) Oral daily  metoprolol tartrate 50 milliGRAM(s) Oral two times a day  potassium chloride   Powder 40 milliEquivalent(s) Oral once    	  	  LABS:	 	      Troponin I, High Sensitivity Result: 6.6 ng/L (12-04 @ 04:43)                            12.6   6.66  )-----------( 335      ( 06 Dec 2022 06:40 )             39.5     12-06    144  |  107  |  20<H>  ----------------------------<  114<H>  3.4<L>   |  28  |  0.79    Ca    10.0      06 Dec 2022 06:40  Phos  3.2     12-06  Mg     2.0     12-06    TPro  6.7  /  Alb  3.0<L>  /  TBili  0.8  /  DBili  x   /  AST  12  /  ALT  17  /  AlkPhos  79  12-06    proBNP:   Lipid Profile: Cholesterol 200  LDL --  HDL 65    Ldl calc 95  Ratio --    HgA1c:   TSH: Thyroid Stimulating Hormone, Serum: 0.19 uU/mL (12-05 @ 04:05)      	      < from: Transthoracic Echocardiogram (12.05.22 @ 13:30) >  OBSERVATIONS:  Mitral Valve: Normal mitral valve. Mild posterior mitral  annular calcification. Trace mitral regurgitation.  Aortic Root: Aortic Root: 2.9 cm.  Ascending Aorta: 2.8 cm.  Aortic Valve: Aortic valve not well visualized;  calcifications are present.  No aortic stenosis by Doppler  gradients.  No aortic valve regurgitation seen.  Left Atrium: Normal left atrium.  Left Ventricle: Endocardium not well visualized; grossly  normal left ventricular systolic function. Segmental wall  motion could not be assessed. Unable to adequately assess  diastolic function due to technical aspects of this study.  Right Heart: Normal right atrium. Off axis images preclude  accurate assessment of right ventricular size. Normal right  ventricular systolic function. Tricuspid valve not well  seen. Trace tricuspid regurgitation. Pulmonic valve not  well seen.  Trace pulmonic insufficiency is noted.  Pericardium/PleuraA prominent epicardial fat pad is noted.  Hemodynamic: RA Pressure is 3 mm Hg. RV systolic pressure  is normal at  13 mm Hg. Bubble study is nondiagnostic.  ------------------------------------------------------------------------    < end of copied text >  < from: MR Head No Cont (12.05.22 @ 14:29) >  ACC: [id number] EXAM:  MR BRAIN                          PROCEDURE DATE:  12/05/2022          INTERPRETATION:  CLINICAL STATEMENT: Pain.    TECHNIQUE: MRI of the brain was performed without gadolinium.    COMPARISON: CT head 12/4/2022    FINDINGS:  There are T2 prolongation signal abnormalities in the periventricular   subcortical white matter likely related to mild chronic microvascular   ischemic changes.    There is no acute parenchymal hemorrhage, parenchymal mass, mass effect   or midline shift. There is no extra-axial fluid collection.  There is no   hydrocephalus.  There is no acute infarct.    Retention cyst/polyp left maxillary sinus    IMPRESSION:  No acute intracranial hemorrhage or acute infarct.      < end of copied text >

## 2022-12-06 NOTE — PROGRESS NOTE ADULT - SUBJECTIVE AND OBJECTIVE BOX
NEUROLOGY FOLLOW-UP NOTE    NAME:  AMPARO REYESUCETA      ASSESSMENT:  72 RHF with acute onset of transient dizziness, nausea, and vomiting, concerning for posterior circulation transient ischemic attack vs. gastrointestinal infection, without neuroimaging evidence of acute ischemic stroke      RECOMMENDATIONS:      1. TIA workup  - Telemetry monitoring while inpatient      2. Stroke prevention  - Q4H Neurochecks & Vital signs  - Aspirin 81mg PO Daily (or Aspirin 300mg NE daily if NPO)  - Atorvastatin 40mg PO QHS (goal LDL < 70 mg/dL)  - Treat BP if over 140/90 (goal /80) - No role for permissive hypertension now given resolution of acute symptoms  - Consider adding Ondansetron 4mg or Metoclopramide 5mg IV Q8H PRN Nausea/Vomiting/Headache  - PT/OT  - DVT ppx: SCDs, Enoxaparin      3. Follow up in Neurology/Stroke clinic to determine TIA/stroke risk factors and to help determine if Aspirin and/or Atorvastatin needed to be maintained at the current doses          NOTE TO BE COMPLETED - PLEASE REFER TO ABOVE ONLY AND IGNORE INFORMATION BELOW    ******************************    HPI:  Patient is a Setswana speaking 73yo female with hx of HTN (not noted to be on medication), presents with dizziness.  Lupe ID [id number] used. Patient states that she has been having the dizziness since last night and has gotten worse throughout the night so she decided to come to the hospital. Patient denies any chest pain, syncope, LE swelling or palpitations. Patient denies any prior episodes. Patient denies any weakness, numbness or tingling, denies dysarthria. Patient states that she is feeling better but states that she has dizziness when she gets up. Patient also reported 1 episode of NBNB emesis at 5 AM this morning. Patient reports nausea but denies abdominal pain or changes to bowel habits. Patient denies fevers or chills.  (04 Dec 2022 13:28)      NEURO HPI:      INTERVAL HISTORY:      MEDICATIONS:  aspirin  chewable 81 milliGRAM(s) Oral daily  atorvastatin 40 milliGRAM(s) Oral at bedtime  cyanocobalamin Injectable 1000 MICROGram(s) SubCutaneous daily  enoxaparin Injectable 40 milliGRAM(s) SubCutaneous every 24 hours  lisinopril 10 milliGRAM(s) Oral daily  metoprolol tartrate 50 milliGRAM(s) Oral two times a day      ALLERGIES:  No Known Allergies      REVIEW OF SYSTEMS:  Fourteen systems reviewed and negative except as in HPI / Interval History.        OBJECTIVE:  Vital Signs Last 24 Hrs  T(C): 36.7 (07 Dec 2022 02:11), Max: 37 (06 Dec 2022 18:34)  T(F): 98.1 (07 Dec 2022 02:11), Max: 98.6 (06 Dec 2022 18:34)  HR: 90 (07 Dec 2022 02:11) (78 - 92)  BP: 143/80 (07 Dec 2022 02:11) (123/85 - 143/80)  BP(mean): --  RR: 17 (07 Dec 2022 02:11) (17 - 19)  SpO2: 95% (07 Dec 2022 02:11) (93% - 99%)    Parameters below as of 07 Dec 2022 02:11  Patient On (Oxygen Delivery Method): room air        General Examination:  General: No acute distress  HEENT: Atraumatic, Normocephalic  Respiratory: CTA B/l.  No crackles, rhonchi, or wheezes.  Cardiovascular: RRR.  Normal S1 & S2.  Normal b/l radial and pedal pulses.    Neurological Examination:  General / Mental Status: AAO x 3.  No aphasia or dysarthria.  Naming and repetition intact.  Cranial Nerves: VFF x 4.  PERRL.  EOMI x 2, No nystagmus or diplopia.  B/l V1-V3 equal and intact to light touch and pinprick.  Symmetric facial movement and palate elevation.  B/l hearing equal to finger rub.  5/5 strength with b/l sternocleidomastoid & trapezius.  Midline tongue protrusion, with no atrophy or fasciculations.  Motor: Normal bulk & tone in all four extremities.  5/5 strength throughout all four extremities.  No downward drift, rigidity, spasticity, or tremors in any of the four extremities.  Sensory: Intact to light touch and pinprick in all four extremities.  Negative Romberg.  Reflex: 2+ and symmetric at b/l biceps, triceps, brachioradialis, patellae, and ankles.  Downgoing toes b/l.  Coordination: No dysmetria with b/l finger-to-nose and heel raise tests.  Symmetric rapid alternating movements b/l.  Gait: Normal, narrow-based gait.  No difficulty with tiptoe, heel, and tandem gaits.        LABORATORY VALUES:                          12.6   6.66  )-----------( 335      ( 06 Dec 2022 06:40 )             39.5       12-06    144  |  107  |  20<H>  ----------------------------<  114<H>  3.4<L>   |  28  |  0.79    Ca    10.0      06 Dec 2022 06:40  Phos  3.2     12-06  Mg     2.0     12-06    TPro  6.7  /  Alb  3.0<L>  /  TBili  0.8  /  DBili  x   /  AST  12  /  ALT  17  /  AlkPhos  79  12-06 12-05 Chol 200<H> LDL -- HDL 65 Trig 199<H>    Glucose Trend  12-06-22 @ 22:04   -  -- -- 102<H>  12-06-22 @ 17:08   -  -- -- 134<H>  12-06-22 @ 11:49   -  -- -- 106<H>  12-06-22 @ 08:06   -  -- -- 114<H>  12-06-22 @ 06:40   -  -- 114<H> --  12-05-22 @ 04:05   -  -- 113<H> --  12-04-22 @ 09:02   -  -- -- 267<H>  12-04-22 @ 04:43   -  -- 173<H> --    Vitamin B12, Serum: <150 pg/mL (12-05-22 @ 04:05)          NEUROIMAGING:          Please contact the Neurology consult service with any neurological questions.      Hever Joel MD   of Neurology  [facility] at [facility]         NEUROLOGY FOLLOW-UP NOTE    NAME:  AMPARO REYESUCETA      ASSESSMENT:  72 RHF with acute onset of transient dizziness, nausea, and vomiting, likely secondary to transient ischemic attack vs. gastrointestinal infection, without neuroimaging evidence of acute ischemic stroke; also with incidental Vitamin B12 deficiency      RECOMMENDATIONS:      1. TIA workup  - Telemetry monitoring while inpatient      2. Stroke prevention  - Q4H Neurochecks & Vital signs  - Aspirin 81mg PO Daily (or Aspirin 300mg MA daily if NPO)  - Atorvastatin 40mg PO QHS (goal LDL < 70 mg/dL)  - Treat BP if over 140/90 (goal /80) - No role for permissive hypertension now given resolution of acute symptoms  - Consider adding Ondansetron 4mg or Metoclopramide 5mg IV Q8H PRN Nausea/Vomiting/Headache  - PT/OT  - DVT ppx: SCDs, Enoxaparin      3. Administer Vitamin B12 at a minimum of 1000 mcg PO Daily to manage Vitamin B12 deficiency      4. Follow up in Neurology/Stroke clinic for management of TIA/stroke risk factors and to help determine if Aspirin and/or Atorvastatin needed to be maintained at the current doses        ******************************    HPI:  Patient is a Mauritian speaking 73yo female with hx of HTN (not noted to be on medication), presents with dizziness.  Lupe ID [id number] used. Patient states that she has been having the dizziness since last night and has gotten worse throughout the night so she decided to come to the hospital. Patient denies any chest pain, syncope, LE swelling or palpitations. Patient denies any prior episodes. Patient denies any weakness, numbness or tingling, denies dysarthria. Patient states that she is feeling better but states that she has dizziness when she gets up. Patient also reported 1 episode of NBNB emesis at 5 AM this morning. Patient reports nausea but denies abdominal pain or changes to bowel habits. Patient denies fevers or chills.  (04 Dec 2022 13:28)      NEURO HPI:  Interview conducted in Mauritian. This is a 72-year-old right-handed woman who experienced acute onset of room-spinning sensation when standing, which worsened overnight prior to presentation, but which has improved since admission to the hospital. She also experienced nausea and vomiting on the morning of admission, but not since being admitted.       INTERVAL HISTORY:  The patient has not had any new episodes of dizziness, nausea, or vomiting overnight.      MEDICATIONS:  aspirin  chewable 81 milliGRAM(s) Oral daily  atorvastatin 40 milliGRAM(s) Oral at bedtime  cyanocobalamin Injectable 1000 MICROGram(s) SubCutaneous daily  enoxaparin Injectable 40 milliGRAM(s) SubCutaneous every 24 hours  lisinopril 10 milliGRAM(s) Oral daily  metoprolol tartrate 50 milliGRAM(s) Oral two times a day      ALLERGIES:  No Known Allergies      REVIEW OF SYSTEMS:  Fourteen systems reviewed and negative except as in HPI / Interval History.        OBJECTIVE:  Vital Signs Last 24 Hrs  T(C): 36.7 (07 Dec 2022 02:11), Max: 37 (06 Dec 2022 18:34)  T(F): 98.1 (07 Dec 2022 02:11), Max: 98.6 (06 Dec 2022 18:34)  HR: 90 (07 Dec 2022 02:11) (78 - 92)  BP: 143/80 (07 Dec 2022 02:11) (123/85 - 143/80)  RR: 17 (07 Dec 2022 02:11) (17 - 19)  SpO2: 95% (07 Dec 2022 02:11) (93% - 99%)  Parameters below as of 07 Dec 2022 02:11  Patient On (Oxygen Delivery Method): room air    General Examination:  General: No acute distress  HEENT: Atraumatic, Normocephalic  Respiratory: CTA B/l.  No crackles, rhonchi, or wheezes.  Cardiovascular: RRR.  Normal S1 & S2.  Normal b/l radial and pedal pulses.    Neurological Examination:  General / Mental Status: AAO x 3.  No aphasia or dysarthria.  Naming and repetition intact.  Cranial Nerves: VFF x 4.  PERRL.  EOMI x 2, No nystagmus or diplopia.  B/l V1-V3 equal and intact to light touch and pinprick.  Symmetric facial movement and palate elevation.  B/l hearing equal to finger rub.  5/5 strength with b/l sternocleidomastoid & trapezius.  Midline tongue protrusion, with no atrophy or fasciculations.  Motor: Normal bulk & tone in all four extremities.  5/5 strength throughout all four extremities.  No downward drift, rigidity, spasticity, or tremors in any of the four extremities.  Sensory: Intact to light touch and pinprick in all four extremities.  Reflex: 2+ and symmetric at b/l biceps, triceps, brachioradialis, patellae, and ankles.  Downgoing toes b/l.  Coordination: No dysmetria with b/l finger-to-nose and heel raise tests.  Gait and Romberg sign testing deferred per patient preference.      NIHSS Score:    LOC - 0  LOC Questions - 0  LOC Commands - 0  Gaze Preference - 0  Visual Fields - 0  Facial Palsy - 0  Motor Arm Left - 0  Motor Arm Right - 0  Motor Leg Left - 0  Motor Leg Right - 0  Limb Ataxia - 0  Sensory - 0  Language - 0  Speech - 0  Extinction - 0    NIHSS Score Total: 0 - No IV tPA because patient presented outside the time window    Modified Russell Scale: 0          LABORATORY VALUES:                          12.6   6.66  )-----------( 335      ( 06 Dec 2022 06:40 )             39.5       12-06    144  |  107  |  20<H>  ----------------------------<  114<H>  3.4<L>   |  28  |  0.79    Ca    10.0      06 Dec 2022 06:40  Phos  3.2     12-06  Mg     2.0     12-06    TPro  6.7  /  Alb  3.0<L>  /  TBili  0.8  /  DBili  x   /  AST  12  /  ALT  17  /  AlkPhos  79  12-06 12-05 Chol 200<H> LDL 95 HDL 65 Trig 199<H>    Glucose Trend  12-06-22 @ 22:04   -  -- -- 102<H>  12-06-22 @ 17:08   -  -- -- 134<H>  12-06-22 @ 11:49   -  -- -- 106<H>  12-06-22 @ 08:06   -  -- -- 114<H>  12-06-22 @ 06:40   -  -- 114<H> --  12-05-22 @ 04:05   -  -- 113<H> --  12-04-22 @ 09:02   -  -- -- 267<H>  12-04-22 @ 04:43   -  -- 173<H> --    Vitamin B12, Serum: <150 pg/mL (12-05-22 @ 04:05)    Thyroid Stimulating Hormone, Serum: 0.19 uU/mL (12.05.22 @ 04:05)   Free Thyroxine, Serum: 1.4 ng/dL (12.07.22 @ 11:55)       A1C with Estimated Average Glucose in AM (12.05.22 @ 04:05)   A1C with Estimated Average Glucose Result: 5.6%   Estimated Average Glucose: 114 mg/dL           NEUROIMAGING:      CT Head & CTA Head/Neck & CT Perfusion Head (12/4/22):  - No acute intracranial structural abnormality  - Left ICA narrowing without definite thrombus  - No other intracranial or neck vessel abnormality  - No focal hypoperfusion      Echo (12/5/22):  - LVEF 55-60%  - Trace mitral and tricuspid regurgitation  - No cardiac embolus or intracardiac shunt identified      MRI Brain (12/5/22):  - No acute intracranial abnormality  - Mild chronic microvascular changes          Please contact the Neurology consult service with any neurological questions.      Hever Joel MD   of Neurology  [facility] at [facility]

## 2022-12-06 NOTE — PROGRESS NOTE ADULT - SUBJECTIVE AND OBJECTIVE BOX
Patient is a 72y old  Female who presents with a chief complaint of Dizziness (05 Dec 2022 12:37)    pt seen in ed tele [ x ], reg med floor [   ], bed [ x ], chair at bedside [   ], a+o x3 [x  ], lethargic [  ],    nad [ x ]      Allergies    No Known Allergies        Vitals    T(F): 98 (12-06-22 @ 04:42), Max: 98.5 (12-05-22 @ 08:01)  HR: 80 (12-06-22 @ 04:42) (75 - 100)  BP: 131/76 (12-06-22 @ 04:42) (131/76 - 153/83)  RR: 18 (12-06-22 @ 04:42) (17 - 18)  SpO2: 96% (12-06-22 @ 04:42) (96% - 98%)  Wt(kg): --  CAPILLARY BLOOD GLUCOSE      POCT Blood Glucose.: 267 mg/dL (04 Dec 2022 09:02)      Labs                          13.0   8.47  )-----------( 374      ( 05 Dec 2022 04:05 )             40.6       12-05    144  |  106  |  21<H>  ----------------------------<  113<H>  3.6   |  30  |  0.83    Ca    9.7      05 Dec 2022 04:05  Phos  3.1     12-05  Mg     2.0     12-05    TPro  6.8  /  Alb  3.3<L>  /  TBili  0.6  /  DBili  x   /  AST  12  /  ALT  18  /  AlkPhos  81  12-05          Troponin I, High Sensitivity Result: 6.6 ng/L (12-04-22 @ 04:43)    .Blood Blood-Peripheral  10-14 @ 16:50   No Growth Final  --  --      Clean Catch Clean Catch (Midstream)  10-14 @ 16:45   No growth  --  --      .Blood Blood-Peripheral  10-14 @ 16:40   No Growth Final  --  --          Radiology Results      Meds    MEDICATIONS  (STANDING):  aspirin  chewable 81 milliGRAM(s) Oral daily  atorvastatin 40 milliGRAM(s) Oral at bedtime  cyanocobalamin Injectable 1000 MICROGram(s) SubCutaneous daily  enoxaparin Injectable 40 milliGRAM(s) SubCutaneous every 24 hours  lisinopril 10 milliGRAM(s) Oral daily  metoprolol tartrate 50 milliGRAM(s) Oral two times a day      MEDICATIONS  (PRN):      Physical Exam    Neuro :  no focal deficits  Respiratory: CTA B/L  CV: RRR, S1S2, no murmurs,   Abdominal: Soft, NT, ND +BS,  Extremities: No edema, + peripheral pulses    ASSESSMENT    dizziness,   r/o cns patho,   r/o arrythmia,   vit b12 defficiency  h/o HTN      PLAN    cont tele,   aspirin, statin,   neuro cons   orthostatic bp q shift   trop x1 neg noted above  cardio cons  f/u echo   vit b12 low noted above  start vit b12 1000mcg sq daily x 7 days  f/u intrinsic factor   tsh low noted above  f/u free t4  endo cons   cont current meds              Patient is a 72y old  Female who presents with a chief complaint of Dizziness (05 Dec 2022 12:37)    pt seen in tele [ x ], reg med floor [   ], bed [ x ], chair at bedside [   ], a+o x3 [x  ], lethargic [  ],    nad [ x ]      Allergies    No Known Allergies        Vitals    T(F): 98 (12-06-22 @ 04:42), Max: 98.5 (12-05-22 @ 08:01)  HR: 80 (12-06-22 @ 04:42) (75 - 100)  BP: 131/76 (12-06-22 @ 04:42) (131/76 - 153/83)  RR: 18 (12-06-22 @ 04:42) (17 - 18)  SpO2: 96% (12-06-22 @ 04:42) (96% - 98%)  Wt(kg): --  CAPILLARY BLOOD GLUCOSE      POCT Blood Glucose.: 267 mg/dL (04 Dec 2022 09:02)      Labs                          13.0   8.47  )-----------( 374      ( 05 Dec 2022 04:05 )             40.6       12-05    144  |  106  |  21<H>  ----------------------------<  113<H>  3.6   |  30  |  0.83    Ca    9.7      05 Dec 2022 04:05  Phos  3.1     12-05  Mg     2.0     12-05    TPro  6.8  /  Alb  3.3<L>  /  TBili  0.6  /  DBili  x   /  AST  12  /  ALT  18  /  AlkPhos  81  12-05          Troponin I, High Sensitivity Result: 6.6 ng/L (12-04-22 @ 04:43)    .Blood Blood-Peripheral  10-14 @ 16:50   No Growth Final  --  --      Clean Catch Clean Catch (Midstream)  10-14 @ 16:45   No growth  --  --      .Blood Blood-Peripheral  10-14 @ 16:40   No Growth Final  --  --    < from: Transthoracic Echocardiogram (12.05.22 @ 13:30) >  CONCLUSIONS:  Technically Difficult Study  1. Normal mitral valve. Mild posterior mitral annular  calcification. Trace mitral regurgitation.  2. Aortic valve not well visualized; calcifications are  present.  No aortic stenosis by Doppler gradients.  No  aortic valve regurgitation seen.  3. Endocardium not well visualized; grossly normal left  ventricular systolic function. Segmental wall motion could  not be assessed.  4. Unable to adequately assess diastolic function due to  technical aspects of this study.  5. Off axis images preclude accurate assessment of right  ventricular size. Normal right ventricular systolic  function.  6. Tricuspid valve not well seen. Trace tricuspid  regurgitation.  7. Bubble study is nondiagnostic.  8. A prominent epicardial fat pad is noted.  Ejection Fraction Visual Estimate: 55-60 %      < end of copied text >        Radiology Results  < from: MR Head No Cont (12.05.22 @ 14:29) >  IMPRESSION:  No acute intracranial hemorrhage or acute infarct.    < end of copied text >      Meds    MEDICATIONS  (STANDING):  aspirin  chewable 81 milliGRAM(s) Oral daily  atorvastatin 40 milliGRAM(s) Oral at bedtime  cyanocobalamin Injectable 1000 MICROGram(s) SubCutaneous daily  enoxaparin Injectable 40 milliGRAM(s) SubCutaneous every 24 hours  lisinopril 10 milliGRAM(s) Oral daily  metoprolol tartrate 50 milliGRAM(s) Oral two times a day      MEDICATIONS  (PRN):      Physical Exam    Neuro :  no focal deficits  Respiratory: CTA B/L  CV: RRR, S1S2, no murmurs,   Abdominal: Soft, NT, ND +BS,  Extremities: No edema, + peripheral pulses    ASSESSMENT    dizziness likely 2nd to vit b12 defficiency,   cns patho r/o,   r/o arrythmia,   vit b12 defficiency  h/o HTN      PLAN    cont tele,   aspirin, statin,   neuro f/u    MRI Brain with No acute intracranial hemorrhage or acute infarct noted above.  PT/OT  DVT ppx: SCDs, Enoxaparin  orthostatic bp q shift   trop x1 neg noted above  cardio f/u   echo with Ejection Fraction Visual Estimate: 55-60 %,Bubble study is nondiagnostic noted above  vit b12 low noted above  cont vit b12 1000mcg sq daily x 7 days  f/u intrinsic factor   tsh low noted    f/u free t4  endo cons   cont current meds     `

## 2022-12-07 LAB
ALBUMIN SERPL ELPH-MCNC: 3.3 G/DL — LOW (ref 3.5–5)
ALP SERPL-CCNC: 79 U/L — SIGNIFICANT CHANGE UP (ref 40–120)
ALT FLD-CCNC: 18 U/L DA — SIGNIFICANT CHANGE UP (ref 10–60)
ANION GAP SERPL CALC-SCNC: 10 MMOL/L — SIGNIFICANT CHANGE UP (ref 5–17)
AST SERPL-CCNC: 13 U/L — SIGNIFICANT CHANGE UP (ref 10–40)
BILIRUB SERPL-MCNC: 0.8 MG/DL — SIGNIFICANT CHANGE UP (ref 0.2–1.2)
BUN SERPL-MCNC: 18 MG/DL — SIGNIFICANT CHANGE UP (ref 7–18)
CALCIUM SERPL-MCNC: 9.8 MG/DL — SIGNIFICANT CHANGE UP (ref 8.4–10.5)
CHLORIDE SERPL-SCNC: 107 MMOL/L — SIGNIFICANT CHANGE UP (ref 96–108)
CO2 SERPL-SCNC: 27 MMOL/L — SIGNIFICANT CHANGE UP (ref 22–31)
CREAT SERPL-MCNC: 0.89 MG/DL — SIGNIFICANT CHANGE UP (ref 0.5–1.3)
EGFR: 69 ML/MIN/1.73M2 — SIGNIFICANT CHANGE UP
GLUCOSE BLDC GLUCOMTR-MCNC: 102 MG/DL — HIGH (ref 70–99)
GLUCOSE BLDC GLUCOMTR-MCNC: 87 MG/DL — SIGNIFICANT CHANGE UP (ref 70–99)
GLUCOSE BLDC GLUCOMTR-MCNC: 97 MG/DL — SIGNIFICANT CHANGE UP (ref 70–99)
GLUCOSE BLDC GLUCOMTR-MCNC: 98 MG/DL — SIGNIFICANT CHANGE UP (ref 70–99)
GLUCOSE SERPL-MCNC: 104 MG/DL — HIGH (ref 70–99)
HCT VFR BLD CALC: 40.6 % — SIGNIFICANT CHANGE UP (ref 34.5–45)
HGB BLD-MCNC: 13.1 G/DL — SIGNIFICANT CHANGE UP (ref 11.5–15.5)
MAGNESIUM SERPL-MCNC: 2.1 MG/DL — SIGNIFICANT CHANGE UP (ref 1.6–2.6)
MCHC RBC-ENTMCNC: 29.1 PG — SIGNIFICANT CHANGE UP (ref 27–34)
MCHC RBC-ENTMCNC: 32.3 GM/DL — SIGNIFICANT CHANGE UP (ref 32–36)
MCV RBC AUTO: 90.2 FL — SIGNIFICANT CHANGE UP (ref 80–100)
NRBC # BLD: 0 /100 WBCS — SIGNIFICANT CHANGE UP (ref 0–0)
PHOSPHATE SERPL-MCNC: 3.2 MG/DL — SIGNIFICANT CHANGE UP (ref 2.5–4.5)
PLATELET # BLD AUTO: 349 K/UL — SIGNIFICANT CHANGE UP (ref 150–400)
POTASSIUM SERPL-MCNC: 3.4 MMOL/L — LOW (ref 3.5–5.3)
POTASSIUM SERPL-SCNC: 3.4 MMOL/L — LOW (ref 3.5–5.3)
PROT SERPL-MCNC: 7 G/DL — SIGNIFICANT CHANGE UP (ref 6–8.3)
RBC # BLD: 4.5 M/UL — SIGNIFICANT CHANGE UP (ref 3.8–5.2)
RBC # FLD: 13 % — SIGNIFICANT CHANGE UP (ref 10.3–14.5)
SODIUM SERPL-SCNC: 144 MMOL/L — SIGNIFICANT CHANGE UP (ref 135–145)
T4 FREE SERPL-MCNC: 1.4 NG/DL — SIGNIFICANT CHANGE UP (ref 0.9–1.8)
WBC # BLD: 6.38 K/UL — SIGNIFICANT CHANGE UP (ref 3.8–10.5)
WBC # FLD AUTO: 6.38 K/UL — SIGNIFICANT CHANGE UP (ref 3.8–10.5)

## 2022-12-07 RX ORDER — PREGABALIN 225 MG/1
1000 CAPSULE ORAL
Qty: 8 | Refills: 0
Start: 2022-12-07 | End: 2022-12-14

## 2022-12-07 RX ORDER — PREGABALIN 225 MG/1
1 CAPSULE ORAL
Qty: 30 | Refills: 0
Start: 2022-12-07 | End: 2023-01-05

## 2022-12-07 RX ADMIN — ATORVASTATIN CALCIUM 40 MILLIGRAM(S): 80 TABLET, FILM COATED ORAL at 22:45

## 2022-12-07 RX ADMIN — Medication 50 MILLIGRAM(S): at 05:37

## 2022-12-07 RX ADMIN — LISINOPRIL 10 MILLIGRAM(S): 2.5 TABLET ORAL at 05:37

## 2022-12-07 RX ADMIN — Medication 50 MILLIGRAM(S): at 20:10

## 2022-12-07 RX ADMIN — ENOXAPARIN SODIUM 40 MILLIGRAM(S): 100 INJECTION SUBCUTANEOUS at 22:45

## 2022-12-07 RX ADMIN — PREGABALIN 1000 MICROGRAM(S): 225 CAPSULE ORAL at 11:27

## 2022-12-07 RX ADMIN — Medication 81 MILLIGRAM(S): at 11:27

## 2022-12-07 NOTE — PROGRESS NOTE ADULT - PROBLEM SELECTOR PLAN 1
posterior circulation transient ischemic attack vs. ischemic stroke vs. gastrointestinal infection  CT head showing possible thrombus in Left ICA no acute hemorrhage, normal CTA head and neck   can be 2/2 to posterior CVA vs infectious causes vs orthostasis   EKG NSR, normal troponin level   continue aspirin and statin   continue telemetry monitoring   Echo- negative, bubble study inconclusive  MR brain- negative for acute hemorrhage, stroke  Neurology Dr. Joel   Cardiology Dr. Gibson

## 2022-12-07 NOTE — PROGRESS NOTE ADULT - SUBJECTIVE AND OBJECTIVE BOX
PGY-1 Progress Note discussed with attending    PAGER #: [[phone]] TILL 5:00 PM  PLEASE CONTACT ON CALL TEAM:  - On Call Team (Please refer to Tarah) FROM 5:00 PM - 8:30PM  - Nightfloat Team FROM 8:30 -7:30 AM    CHIEF COMPLAINT & BRIEF HOSPITAL COURSE:    INTERVAL HPI/OVERNIGHT EVENTS:   MEDICATIONS  (STANDING):  aspirin  chewable 81 milliGRAM(s) Oral daily  atorvastatin 40 milliGRAM(s) Oral at bedtime  cyanocobalamin Injectable 1000 MICROGram(s) SubCutaneous daily  enoxaparin Injectable 40 milliGRAM(s) SubCutaneous every 24 hours  lisinopril 10 milliGRAM(s) Oral daily  metoprolol tartrate 50 milliGRAM(s) Oral two times a day    MEDICATIONS  (PRN):      REVIEW OF SYSTEMS:  CONSTITUTIONAL: No fever, weight loss, or fatigue  RESPIRATORY: No cough, wheezing, chills or hemoptysis; No shortness of breath  CARDIOVASCULAR: No chest pain, palpitations, dizziness, or leg swelling  GASTROINTESTINAL: No abdominal pain. No nausea, vomiting, or hematemesis; No diarrhea or constipation. No melena or hematochezia.  GENITOURINARY: No dysuria or hematuria, urinary frequency  NEUROLOGICAL: No headaches, memory loss, loss of strength, numbness, or tremors  SKIN: No itching, burning, rashes, or lesions     Vital Signs Last 24 Hrs  T(C): 36.7 (07 Dec 2022 05:24), Max: 37 (06 Dec 2022 18:34)  T(F): 98.1 (07 Dec 2022 05:24), Max: 98.6 (06 Dec 2022 18:34)  HR: 79 (07 Dec 2022 05:24) (78 - 92)  BP: 152/76 (07 Dec 2022 05:24) (123/85 - 152/76)  BP(mean): --  RR: 17 (07 Dec 2022 05:24) (17 - 19)  SpO2: 96% (07 Dec 2022 05:24) (93% - 99%)    Parameters below as of 07 Dec 2022 05:24  Patient On (Oxygen Delivery Method): room air        PHYSICAL EXAMINATION:  GENERAL: NAD, well built  HEAD:  Atraumatic, Normocephalic  EYES:  conjunctiva and sclera clear  NECK: Supple, No JVD, Normal thyroid  CHEST/LUNG: Clear to auscultation. Clear to percussion bilaterally; No rales, rhonchi, wheezing, or rubs  HEART: Regular rate and rhythm; No murmurs, rubs, or gallops  ABDOMEN: Soft, Nontender, Nondistended; Bowel sounds present  NERVOUS SYSTEM:  Alert & Oriented X3,    EXTREMITIES:  2+ Peripheral Pulses, No clubbing, cyanosis, or edema  SKIN: warm dry                          12.6   6.66  )-----------( 335      ( 06 Dec 2022 06:40 )             39.5     12-06    144  |  107  |  20<H>  ----------------------------<  114<H>  3.4<L>   |  28  |  0.79    Ca    10.0      06 Dec 2022 06:40  Phos  3.2     12-06  Mg     2.0     12-06    TPro  6.7  /  Alb  3.0<L>  /  TBili  0.8  /  DBili  x   /  AST  12  /  ALT  17  /  AlkPhos  79  12-06    LIVER FUNCTIONS - ( 06 Dec 2022 06:40 )  Alb: 3.0 g/dL / Pro: 6.7 g/dL / ALK PHOS: 79 U/L / ALT: 17 U/L DA / AST: 12 U/L / GGT: x                   CAPILLARY BLOOD GLUCOSE      RADIOLOGY & ADDITIONAL TESTS:                   PGY-1 Progress Note discussed with attending    PLEASE CONTACT ON CALL TEAM:  - On Call Team (Please refer to Tarah) FROM 5:00 PM - 8:30PM  - Nightfloat Team FROM 8:30 -7:30 AM    INTERVAL HPI/OVERNIGHT EVENTS:     Pt. A&Ox3, no acute events overnight. Pt. denies chest pain, light-headedness, dizziness, weakness, sob. Pt. for d/c to home.     MEDICATIONS  (STANDING):  aspirin  chewable 81 milliGRAM(s) Oral daily  atorvastatin 40 milliGRAM(s) Oral at bedtime  cyanocobalamin Injectable 1000 MICROGram(s) SubCutaneous daily  enoxaparin Injectable 40 milliGRAM(s) SubCutaneous every 24 hours  lisinopril 10 milliGRAM(s) Oral daily  metoprolol tartrate 50 milliGRAM(s) Oral two times a day    MEDICATIONS  (PRN):      REVIEW OF SYSTEMS:  CONSTITUTIONAL: No fever, weight loss, or fatigue  RESPIRATORY: No cough, wheezing, chills or hemoptysis; No shortness of breath  CARDIOVASCULAR: No chest pain, palpitations, dizziness, or leg swelling  GASTROINTESTINAL: No abdominal pain. No nausea, vomiting, or hematemesis; No diarrhea or constipation. No melena or hematochezia.  GENITOURINARY: No dysuria or hematuria, urinary frequency  NEUROLOGICAL: No headaches, memory loss, loss of strength, numbness, or tremors  SKIN: No itching, burning, rashes, or lesions     Vital Signs Last 24 Hrs  T(C): 36.7 (07 Dec 2022 05:24), Max: 37 (06 Dec 2022 18:34)  T(F): 98.1 (07 Dec 2022 05:24), Max: 98.6 (06 Dec 2022 18:34)  HR: 79 (07 Dec 2022 05:24) (78 - 92)  BP: 152/76 (07 Dec 2022 05:24) (123/85 - 152/76)  BP(mean): --  RR: 17 (07 Dec 2022 05:24) (17 - 19)  SpO2: 96% (07 Dec 2022 05:24) (93% - 99%)    Parameters below as of 07 Dec 2022 05:24  Patient On (Oxygen Delivery Method): room air        PHYSICAL EXAMINATION:  GENERAL: NAD, well built  HEAD:  Atraumatic, Normocephalic  EYES:  conjunctiva and sclera clear  NECK: Supple, No JVD, Normal thyroid  CHEST/LUNG: Clear to auscultation. Clear to percussion bilaterally; No rales, rhonchi, wheezing, or rubs  HEART: Regular rate and rhythm; No murmurs, rubs, or gallops  ABDOMEN: Soft, Nontender, Nondistended; Bowel sounds present  NERVOUS SYSTEM:  Alert & Oriented X3,  no focal neuro deficits   EXTREMITIES:  2+ Peripheral Pulses, No clubbing, cyanosis, or edema  SKIN: warm dry                          12.6   6.66  )-----------( 335      ( 06 Dec 2022 06:40 )             39.5     12-06    144  |  107  |  20<H>  ----------------------------<  114<H>  3.4<L>   |  28  |  0.79    Ca    10.0      06 Dec 2022 06:40  Phos  3.2     12-06  Mg     2.0     12-06    TPro  6.7  /  Alb  3.0<L>  /  TBili  0.8  /  DBili  x   /  AST  12  /  ALT  17  /  AlkPhos  79  12-06    LIVER FUNCTIONS - ( 06 Dec 2022 06:40 )  Alb: 3.0 g/dL / Pro: 6.7 g/dL / ALK PHOS: 79 U/L / ALT: 17 U/L DA / AST: 12 U/L / GGT: x                   CAPILLARY BLOOD GLUCOSE      RADIOLOGY & ADDITIONAL TESTS:

## 2022-12-07 NOTE — PROGRESS NOTE ADULT - SUBJECTIVE AND OBJECTIVE BOX
CHIEF COMPLAINT:Patient is a 72y old  Female who presents with a chief complaint of Dizziness.Pt appears comfortable.    	  REVIEW OF SYSTEMS:  CONSTITUTIONAL: No fever, weight loss, or fatigue  EYES: No eye pain, visual disturbances, or discharge  ENT:  No difficulty hearing, tinnitus, vertigo; No sinus or throat pain  NECK: No pain or stiffness  RESPIRATORY: No cough, wheezing, chills or hemoptysis; No Shortness of Breath  CARDIOVASCULAR: No chest pain, palpitations, passing out, dizziness, or leg swelling  GASTROINTESTINAL: No abdominal or epigastric pain. No nausea, vomiting, or hematemesis; No diarrhea or constipation. No melena or hematochezia.  GENITOURINARY: No dysuria, frequency, hematuria, or incontinence  NEUROLOGICAL: No headaches, memory loss, loss of strength, numbness, or tremors  SKIN: No itching, burning, rashes, or lesions   LYMPH Nodes: No enlarged glands  ENDOCRINE: No heat or cold intolerance; No hair loss  MUSCULOSKELETAL: No joint pain or swelling; No muscle, back, or extremity pain  PSYCHIATRIC: No depression, anxiety, mood swings, or difficulty sleeping  HEME/LYMPH: No easy bruising, or bleeding gums  ALLERGY AND IMMUNOLOGIC: No hives or eczema	        PHYSICAL EXAM:  T(C): 36.7 (12-07-22 @ 05:24), Max: 37 (12-06-22 @ 18:34)  HR: 79 (12-07-22 @ 05:24) (78 - 92)  BP: 152/76 (12-07-22 @ 05:24) (123/85 - 152/76)  RR: 17 (12-07-22 @ 05:24) (17 - 19)  SpO2: 96% (12-07-22 @ 05:24) (95% - 99%)      Appearance: Normal	  HEENT:   Normal oral mucosa, PERRL, EOMI	  Lymphatic: No lymphadenopathy  Cardiovascular: Normal S1 S2, No JVD, No murmurs, No edema  Respiratory: Lungs clear to auscultation	  Psychiatry: A & O x 3, Mood & affect appropriate  Gastrointestinal:  Soft, Non-tender, + BS	  Skin: No rashes, No ecchymoses, No cyanosis	  Neurologic: Non-focal  Extremities: Normal range of motion, No clubbing, cyanosis or edema  Vascular: Peripheral pulses palpable 2+ bilaterally    MEDICATIONS  (STANDING):  aspirin  chewable 81 milliGRAM(s) Oral daily  atorvastatin 40 milliGRAM(s) Oral at bedtime  cyanocobalamin Injectable 1000 MICROGram(s) SubCutaneous daily  enoxaparin Injectable 40 milliGRAM(s) SubCutaneous every 24 hours  lisinopril 10 milliGRAM(s) Oral daily  metoprolol tartrate 50 milliGRAM(s) Oral two times a day      LABS:	 	                       13.1   6.38  )-----------( 349      ( 07 Dec 2022 05:26 )             40.6     12-07    144  |  107  |  18  ----------------------------<  104<H>  3.4<L>   |  27  |  0.89    Ca    9.8      07 Dec 2022 05:26  Phos  3.2     12-07  Mg     2.1     12-07    TPro  7.0  /  Alb  3.3<L>  /  TBili  0.8  /  DBili  x   /  AST  13  /  ALT  18  /  AlkPhos  79  12-07    proBNP:   Lipid Profile: Cholesterol 200  LDL --  HDL 65    Ldl calc 95  Ratio --    HgA1c:   TSH: Thyroid Stimulating Hormone, Serum: 0.19 uU/mL (12-05 @ 04:05)

## 2022-12-07 NOTE — PROGRESS NOTE ADULT - SUBJECTIVE AND OBJECTIVE BOX
Patient is a 72y old  Female who presents with a chief complaint of Dizziness (06 Dec 2022 20:09)    pt seen in ed tele [ x ], reg med floor [   ], bed [ x ], chair at bedside [   ], a+o x3 [x  ], lethargic [  ],    nad [ x ]        Allergies    No Known Allergies        Vitals    T(F): 98.1 (12-07-22 @ 05:24), Max: 98.6 (12-06-22 @ 18:34)  HR: 79 (12-07-22 @ 05:24) (78 - 92)  BP: 152/76 (12-07-22 @ 05:24) (123/85 - 152/76)  RR: 17 (12-07-22 @ 05:24) (17 - 19)  SpO2: 96% (12-07-22 @ 05:24) (93% - 99%)  Wt(kg): --  CAPILLARY BLOOD GLUCOSE      POCT Blood Glucose.: 102 mg/dL (06 Dec 2022 22:04)      Labs                          12.6   6.66  )-----------( 335      ( 06 Dec 2022 06:40 )             39.5       12-06    144  |  107  |  20<H>  ----------------------------<  114<H>  3.4<L>   |  28  |  0.79    Ca    10.0      06 Dec 2022 06:40  Phos  3.2     12-06  Mg     2.0     12-06    TPro  6.7  /  Alb  3.0<L>  /  TBili  0.8  /  DBili  x   /  AST  12  /  ALT  17  /  AlkPhos  79  12-06            .Blood Blood-Peripheral  10-14 @ 16:50   No Growth Final  --  --      Clean Catch Clean Catch (Midstream)  10-14 @ 16:45   No growth  --  --      .Blood Blood-Peripheral  10-14 @ 16:40   No Growth Final  --  --          Radiology Results      Meds    MEDICATIONS  (STANDING):  aspirin  chewable 81 milliGRAM(s) Oral daily  atorvastatin 40 milliGRAM(s) Oral at bedtime  cyanocobalamin Injectable 1000 MICROGram(s) SubCutaneous daily  enoxaparin Injectable 40 milliGRAM(s) SubCutaneous every 24 hours  lisinopril 10 milliGRAM(s) Oral daily  metoprolol tartrate 50 milliGRAM(s) Oral two times a day      MEDICATIONS  (PRN):      Physical Exam    Neuro :  no focal deficits  Respiratory: CTA B/L  CV: RRR, S1S2, no murmurs,   Abdominal: Soft, NT, ND +BS,  Extremities: No edema, + peripheral pulses    ASSESSMENT    dizziness,   r/o cns patho,   r/o arrythmia,   vit b12 defficiency  h/o HTN      PLAN    cont tele,   aspirin, statin,   neuro cons   orthostatic bp q shift   trop x1 neg noted above  cardio cons  f/u echo   vit b12 low noted above  start vit b12 1000mcg sq daily x 7 days  f/u intrinsic factor   tsh low noted above  f/u free t4  endo cons   cont current meds              Patient is a 72y old  Female who presents with a chief complaint of Dizziness (06 Dec 2022 20:09)    pt seen in tele [ x ], reg med floor [   ], bed [ x ], chair at bedside [   ], a+o x3 [x  ], lethargic [  ],    nad [ x ]        Allergies    No Known Allergies        Vitals    T(F): 98.1 (12-07-22 @ 05:24), Max: 98.6 (12-06-22 @ 18:34)  HR: 79 (12-07-22 @ 05:24) (78 - 92)  BP: 152/76 (12-07-22 @ 05:24) (123/85 - 152/76)  RR: 17 (12-07-22 @ 05:24) (17 - 19)  SpO2: 96% (12-07-22 @ 05:24) (93% - 99%)  Wt(kg): --  CAPILLARY BLOOD GLUCOSE      POCT Blood Glucose.: 102 mg/dL (06 Dec 2022 22:04)      Labs                          12.6   6.66  )-----------( 335      ( 06 Dec 2022 06:40 )             39.5       12-06    144  |  107  |  20<H>  ----------------------------<  114<H>  3.4<L>   |  28  |  0.79    Ca    10.0      06 Dec 2022 06:40  Phos  3.2     12-06  Mg     2.0     12-06    TPro  6.7  /  Alb  3.0<L>  /  TBili  0.8  /  DBili  x   /  AST  12  /  ALT  17  /  AlkPhos  79  12-06            .Blood Blood-Peripheral  10-14 @ 16:50   No Growth Final  --  --      Clean Catch Clean Catch (Midstream)  10-14 @ 16:45   No growth  --  --      .Blood Blood-Peripheral  10-14 @ 16:40   No Growth Final  --  --          Radiology Results      Meds    MEDICATIONS  (STANDING):  aspirin  chewable 81 milliGRAM(s) Oral daily  atorvastatin 40 milliGRAM(s) Oral at bedtime  cyanocobalamin Injectable 1000 MICROGram(s) SubCutaneous daily  enoxaparin Injectable 40 milliGRAM(s) SubCutaneous every 24 hours  lisinopril 10 milliGRAM(s) Oral daily  metoprolol tartrate 50 milliGRAM(s) Oral two times a day      MEDICATIONS  (PRN):      Physical Exam    Neuro :  no focal deficits  Respiratory: CTA B/L  CV: RRR, S1S2, no murmurs,   Abdominal: Soft, NT, ND +BS,  Extremities: No edema, + peripheral pulses      ASSESSMENT    dizziness likely 2nd to vit b12 defficiency,   cns patho r/o,   h/o HTN      PLAN    d/c tele,   aspirin, statin,   neuro f/u    MRI Brain with No acute intracranial hemorrhage or acute infarct noted  .  Follow up in Neurology/Stroke clinic to determine TIA/stroke risk factors and to help determine if Aspirin and/or Atorvastatin needed to be maintained at the current dosesn  orthostatic bp q shift   trop x1 neg noted above  cardio f/u   echo with Ejection Fraction Visual Estimate: 55-60 %,Bubble study is nondiagnostic noted    vit b12 low noted   cont vit b12 1000mcg sq daily day 3/7 days  f/u intrinsic factor   tsh low noted    f/u free t4  endo cons   phys tx eval noted and rec no skilled phys tx needs  cont current meds          Patient is a 72y old  Female who presents with a chief complaint of Dizziness (06 Dec 2022 20:09)    pt seen in tele [ x ], reg med floor [   ], bed [ x ], chair at bedside [   ], a+o x3 [x  ], lethargic [  ],    nad [ x ]        Allergies    No Known Allergies        Vitals    T(F): 98.1 (12-07-22 @ 05:24), Max: 98.6 (12-06-22 @ 18:34)  HR: 79 (12-07-22 @ 05:24) (78 - 92)  BP: 152/76 (12-07-22 @ 05:24) (123/85 - 152/76)  RR: 17 (12-07-22 @ 05:24) (17 - 19)  SpO2: 96% (12-07-22 @ 05:24) (93% - 99%)  Wt(kg): --  CAPILLARY BLOOD GLUCOSE      POCT Blood Glucose.: 102 mg/dL (06 Dec 2022 22:04)      Labs                          12.6   6.66  )-----------( 335      ( 06 Dec 2022 06:40 )             39.5       12-06    144  |  107  |  20<H>  ----------------------------<  114<H>  3.4<L>   |  28  |  0.79    Ca    10.0      06 Dec 2022 06:40  Phos  3.2     12-06  Mg     2.0     12-06    TPro  6.7  /  Alb  3.0<L>  /  TBili  0.8  /  DBili  x   /  AST  12  /  ALT  17  /  AlkPhos  79  12-06            .Blood Blood-Peripheral  10-14 @ 16:50   No Growth Final  --  --      Clean Catch Clean Catch (Midstream)  10-14 @ 16:45   No growth  --  --      .Blood Blood-Peripheral  10-14 @ 16:40   No Growth Final  --  --          Radiology Results      Meds    MEDICATIONS  (STANDING):  aspirin  chewable 81 milliGRAM(s) Oral daily  atorvastatin 40 milliGRAM(s) Oral at bedtime  cyanocobalamin Injectable 1000 MICROGram(s) SubCutaneous daily  enoxaparin Injectable 40 milliGRAM(s) SubCutaneous every 24 hours  lisinopril 10 milliGRAM(s) Oral daily  metoprolol tartrate 50 milliGRAM(s) Oral two times a day      MEDICATIONS  (PRN):      Physical Exam    Neuro :  no focal deficits  Respiratory: CTA B/L  CV: RRR, S1S2, no murmurs,   Abdominal: Soft, NT, ND +BS,  Extremities: No edema, + peripheral pulses      ASSESSMENT    dizziness likely 2nd to vit b12 defficiency,   cns patho r/o,   h/o HTN      PLAN    d/c tele,   aspirin, statin,   neuro f/u    MRI Brain with No acute intracranial hemorrhage or acute infarct noted  .  Follow up in Neurology/Stroke clinic to determine TIA/stroke risk factors and to help determine if Aspirin and/or Atorvastatin needed to be maintained at the current doses  orthostatic bp neg  trop x1 neg noted    cardio f/u   echo with Ejection Fraction Visual Estimate: 55-60 %,Bubble study is nondiagnostic noted    vit b12 low noted   cont vit b12 1000mcg sq daily day 3/7 days  f/u intrinsic factor   tsh low noted    f/u free t4   endo cons   phys tx eval noted and rec no skilled phys tx needs  cont current meds

## 2022-12-08 VITALS
RESPIRATION RATE: 18 BRPM | DIASTOLIC BLOOD PRESSURE: 75 MMHG | SYSTOLIC BLOOD PRESSURE: 145 MMHG | HEART RATE: 79 BPM | OXYGEN SATURATION: 97 % | TEMPERATURE: 98 F

## 2022-12-08 LAB
GLUCOSE BLDC GLUCOMTR-MCNC: 106 MG/DL — HIGH (ref 70–99)
GLUCOSE BLDC GLUCOMTR-MCNC: 86 MG/DL — SIGNIFICANT CHANGE UP (ref 70–99)
HCYS SERPL-MCNC: 10.9 UMOL/L — SIGNIFICANT CHANGE UP

## 2022-12-08 PROCEDURE — 84443 ASSAY THYROID STIM HORMONE: CPT

## 2022-12-08 PROCEDURE — 96374 THER/PROPH/DIAG INJ IV PUSH: CPT

## 2022-12-08 PROCEDURE — 0042T: CPT | Mod: ME

## 2022-12-08 PROCEDURE — 83036 HEMOGLOBIN GLYCOSYLATED A1C: CPT

## 2022-12-08 PROCEDURE — 84439 ASSAY OF FREE THYROXINE: CPT

## 2022-12-08 PROCEDURE — 70498 CT ANGIOGRAPHY NECK: CPT | Mod: MG

## 2022-12-08 PROCEDURE — 97162 PT EVAL MOD COMPLEX 30 MIN: CPT

## 2022-12-08 PROCEDURE — 80053 COMPREHEN METABOLIC PANEL: CPT

## 2022-12-08 PROCEDURE — 85730 THROMBOPLASTIN TIME PARTIAL: CPT

## 2022-12-08 PROCEDURE — 83090 ASSAY OF HOMOCYSTEINE: CPT

## 2022-12-08 PROCEDURE — 93306 TTE W/DOPPLER COMPLETE: CPT

## 2022-12-08 PROCEDURE — 82607 VITAMIN B-12: CPT

## 2022-12-08 PROCEDURE — 70496 CT ANGIOGRAPHY HEAD: CPT | Mod: MG

## 2022-12-08 PROCEDURE — 70450 CT HEAD/BRAIN W/O DYE: CPT | Mod: MA

## 2022-12-08 PROCEDURE — 87635 SARS-COV-2 COVID-19 AMP PRB: CPT

## 2022-12-08 PROCEDURE — 81001 URINALYSIS AUTO W/SCOPE: CPT

## 2022-12-08 PROCEDURE — 92610 EVALUATE SWALLOWING FUNCTION: CPT

## 2022-12-08 PROCEDURE — 85610 PROTHROMBIN TIME: CPT

## 2022-12-08 PROCEDURE — 70551 MRI BRAIN STEM W/O DYE: CPT

## 2022-12-08 PROCEDURE — 85025 COMPLETE CBC W/AUTO DIFF WBC: CPT

## 2022-12-08 PROCEDURE — G1004: CPT

## 2022-12-08 PROCEDURE — 83735 ASSAY OF MAGNESIUM: CPT

## 2022-12-08 PROCEDURE — 80061 LIPID PANEL: CPT

## 2022-12-08 PROCEDURE — 99285 EMERGENCY DEPT VISIT HI MDM: CPT | Mod: 25

## 2022-12-08 PROCEDURE — 36415 COLL VENOUS BLD VENIPUNCTURE: CPT

## 2022-12-08 PROCEDURE — 84484 ASSAY OF TROPONIN QUANT: CPT

## 2022-12-08 PROCEDURE — 85027 COMPLETE CBC AUTOMATED: CPT

## 2022-12-08 PROCEDURE — 93005 ELECTROCARDIOGRAM TRACING: CPT

## 2022-12-08 PROCEDURE — 86340 INTRINSIC FACTOR ANTIBODY: CPT

## 2022-12-08 PROCEDURE — 84100 ASSAY OF PHOSPHORUS: CPT

## 2022-12-08 PROCEDURE — 82962 GLUCOSE BLOOD TEST: CPT

## 2022-12-08 RX ADMIN — Medication 81 MILLIGRAM(S): at 13:04

## 2022-12-08 RX ADMIN — Medication 50 MILLIGRAM(S): at 05:21

## 2022-12-08 RX ADMIN — PREGABALIN 1000 MICROGRAM(S): 225 CAPSULE ORAL at 13:04

## 2022-12-08 RX ADMIN — LISINOPRIL 10 MILLIGRAM(S): 2.5 TABLET ORAL at 05:21

## 2022-12-08 NOTE — PROGRESS NOTE ADULT - PROVIDER SPECIALTY LIST ADULT
Internal Medicine
Internal Medicine
Neurology
Cardiology
Cardiology
Internal Medicine
Internal Medicine
Cardiology
Internal Medicine

## 2022-12-08 NOTE — PROGRESS NOTE ADULT - SUBJECTIVE AND OBJECTIVE BOX
PGY-1 Progress Note discussed with attending    PLEASE CONTACT ON CALL TEAM:  - On Call Team (Please refer to Tarah) FROM 5:00 PM - 8:30PM  - Nightfloat Team FROM 8:30 -7:30 AM    INTERVAL HPI/OVERNIGHT EVENTS:     Pt. A&Ox3, no acute events overnight, denies any complaints currently. for d/c to home today, pending vivo med delivery.     MEDICATIONS  (STANDING):  aspirin  chewable 81 milliGRAM(s) Oral daily  atorvastatin 40 milliGRAM(s) Oral at bedtime  cyanocobalamin Injectable 1000 MICROGram(s) SubCutaneous daily  enoxaparin Injectable 40 milliGRAM(s) SubCutaneous every 24 hours  lisinopril 10 milliGRAM(s) Oral daily  metoprolol tartrate 50 milliGRAM(s) Oral two times a day    MEDICATIONS  (PRN):      REVIEW OF SYSTEMS:  CONSTITUTIONAL: No fever, weight loss, or fatigue  RESPIRATORY: No cough, wheezing, chills or hemoptysis; No shortness of breath  CARDIOVASCULAR: No chest pain, palpitations, dizziness, or leg swelling  GASTROINTESTINAL: No abdominal pain. No nausea, vomiting, or hematemesis; No diarrhea or constipation. No melena or hematochezia.  GENITOURINARY: No dysuria or hematuria, urinary frequency  NEUROLOGICAL: No headaches, memory loss, loss of strength, numbness, or tremors  SKIN: No itching, burning, rashes, or lesions     Vital Signs Last 24 Hrs  T(C): 36.9 (08 Dec 2022 13:26), Max: 36.9 (08 Dec 2022 13:26)  T(F): 98.5 (08 Dec 2022 13:26), Max: 98.5 (08 Dec 2022 13:26)  HR: 79 (08 Dec 2022 13:26) (78 - 84)  BP: 145/75 (08 Dec 2022 13:26) (137/68 - 153/79)  BP(mean): --  RR: 18 (08 Dec 2022 13:26) (18 - 18)  SpO2: 97% (08 Dec 2022 13:26) (95% - 97%)    Parameters below as of 08 Dec 2022 13:26  Patient On (Oxygen Delivery Method): room air        PHYSICAL EXAMINATION:  GENERAL: NAD, well built  HEAD:  Atraumatic, Normocephalic  EYES:  conjunctiva and sclera clear  NECK: Supple, No JVD, Normal thyroid  CHEST/LUNG: Clear to auscultation. Clear to percussion bilaterally; No rales, rhonchi, wheezing, or rubs  HEART: Regular rate and rhythm; No murmurs, rubs, or gallops  ABDOMEN: Soft, Nontender, Nondistended; Bowel sounds present  NERVOUS SYSTEM:  Alert & Oriented X3,    EXTREMITIES:  2+ Peripheral Pulses, No clubbing, cyanosis, or edema  SKIN: warm dry                          13.1   6.38  )-----------( 349      ( 07 Dec 2022 05:26 )             40.6     12-07    144  |  107  |  18  ----------------------------<  104<H>  3.4<L>   |  27  |  0.89    Ca    9.8      07 Dec 2022 05:26  Phos  3.2     12-07  Mg     2.1     12-07    TPro  7.0  /  Alb  3.3<L>  /  TBili  0.8  /  DBili  x   /  AST  13  /  ALT  18  /  AlkPhos  79  12-07    LIVER FUNCTIONS - ( 07 Dec 2022 05:26 )  Alb: 3.3 g/dL / Pro: 7.0 g/dL / ALK PHOS: 79 U/L / ALT: 18 U/L DA / AST: 13 U/L / GGT: x                   CAPILLARY BLOOD GLUCOSE      RADIOLOGY & ADDITIONAL TESTS:

## 2022-12-08 NOTE — PHARMACOTHERAPY INTERVENTION NOTE - COMMENTS
Pt was provided counseling for medication (Vit B12 inj) from [facility].   was used (ID # [id number]). Written material in [place]ogolese was also provided. Pt only inquired about the actual administration of the vitamins and was told that the RN would teach on how it is to be done.

## 2022-12-08 NOTE — PROGRESS NOTE ADULT - PROBLEM SELECTOR PLAN 2
home medications ramipril 2.5mg QD and Bisoprolol 5mg qd  continue lisinopril 10mg qd with holding parameters  started on metoprolol- therapeutic exchange for bisoprolol  DASH diet

## 2022-12-08 NOTE — PROGRESS NOTE ADULT - SUBJECTIVE AND OBJECTIVE BOX
Patient is a 72y old  Female who presents with a chief complaint of Dizziness (07 Dec 2022 12:44)    pt seen in tele [ x ], reg med floor [   ], bed [ x ], chair at bedside [   ], a+o x3 [x  ], lethargic [  ],    nad [ x ]        Allergies    No Known Allergies        Vitals    T(F): 97.9 (12-08-22 @ 05:13), Max: 98.2 (12-07-22 @ 14:37)  HR: 80 (12-08-22 @ 05:13) (78 - 89)  BP: 137/68 (12-08-22 @ 05:13) (126/66 - 153/79)  RR: 18 (12-08-22 @ 05:13) (17 - 18)  SpO2: 95% (12-08-22 @ 05:13) (95% - 99%)  Wt(kg): --  CAPILLARY BLOOD GLUCOSE      POCT Blood Glucose.: 98 mg/dL (07 Dec 2022 21:32)      Labs                          13.1   6.38  )-----------( 349      ( 07 Dec 2022 05:26 )             40.6       12-07    144  |  107  |  18  ----------------------------<  104<H>  3.4<L>   |  27  |  0.89    Ca    9.8      07 Dec 2022 05:26  Phos  3.2     12-07  Mg     2.1     12-07    TPro  7.0  /  Alb  3.3<L>  /  TBili  0.8  /  DBili  x   /  AST  13  /  ALT  18  /  AlkPhos  79  12-07            .Blood Blood-Peripheral  10-14 @ 16:50   No Growth Final  --  --      Clean Catch Clean Catch (Midstream)  10-14 @ 16:45   No growth  --  --      .Blood Blood-Peripheral  10-14 @ 16:40   No Growth Final  --  --          Radiology Results      Meds    MEDICATIONS  (STANDING):  aspirin  chewable 81 milliGRAM(s) Oral daily  atorvastatin 40 milliGRAM(s) Oral at bedtime  cyanocobalamin Injectable 1000 MICROGram(s) SubCutaneous daily  enoxaparin Injectable 40 milliGRAM(s) SubCutaneous every 24 hours  lisinopril 10 milliGRAM(s) Oral daily  metoprolol tartrate 50 milliGRAM(s) Oral two times a day      MEDICATIONS  (PRN):      Physical Exam    Neuro :  no focal deficits  Respiratory: CTA B/L  CV: RRR, S1S2, no murmurs,   Abdominal: Soft, NT, ND +BS,  Extremities: No edema, + peripheral pulses      ASSESSMENT    dizziness likely 2nd to vit b12 defficiency,   cns patho r/o,   h/o HTN      PLAN    d/c tele,   aspirin, statin,   neuro f/u    MRI Brain with No acute intracranial hemorrhage or acute infarct noted  .  Follow up in Neurology/Stroke clinic to determine TIA/stroke risk factors and to help determine if Aspirin and/or Atorvastatin needed to be maintained at the current doses  orthostatic bp neg  trop x1 neg noted    cardio f/u   echo with Ejection Fraction Visual Estimate: 55-60 %,Bubble study is nondiagnostic noted    vit b12 low noted   cont vit b12 1000mcg sq daily day 3/7 days  f/u intrinsic factor   tsh low noted    f/u free t4   endo cons   phys tx eval noted and rec no skilled phys tx needs  cont current meds                Patient is a 72y old  Female who presents with a chief complaint of Dizziness (07 Dec 2022 12:44)    pt seen in tele [ x ], reg med floor [   ], bed [ x ], chair at bedside [   ], a+o x3 [x  ], lethargic [  ],    nad [ x ]        Allergies    No Known Allergies        Vitals    T(F): 97.9 (12-08-22 @ 05:13), Max: 98.2 (12-07-22 @ 14:37)  HR: 80 (12-08-22 @ 05:13) (78 - 89)  BP: 137/68 (12-08-22 @ 05:13) (126/66 - 153/79)  RR: 18 (12-08-22 @ 05:13) (17 - 18)  SpO2: 95% (12-08-22 @ 05:13) (95% - 99%)  Wt(kg): --  CAPILLARY BLOOD GLUCOSE      POCT Blood Glucose.: 98 mg/dL (07 Dec 2022 21:32)      Labs                          13.1   6.38  )-----------( 349      ( 07 Dec 2022 05:26 )             40.6       12-07    144  |  107  |  18  ----------------------------<  104<H>  3.4<L>   |  27  |  0.89    Ca    9.8      07 Dec 2022 05:26  Phos  3.2     12-07  Mg     2.1     12-07    TPro  7.0  /  Alb  3.3<L>  /  TBili  0.8  /  DBili  x   /  AST  13  /  ALT  18  /  AlkPhos  79  12-07    Free Thyroxine, Serum in AM (12.07.22 @ 11:55)   Free Thyroxine, Serum: 1.4        .Blood Blood-Peripheral  10-14 @ 16:50   No Growth Final  --  --      Clean Catch Clean Catch (Midstream)  10-14 @ 16:45   No growth  --  --      .Blood Blood-Peripheral  10-14 @ 16:40   No Growth Final  --  --          Radiology Results      Meds    MEDICATIONS  (STANDING):  aspirin  chewable 81 milliGRAM(s) Oral daily  atorvastatin 40 milliGRAM(s) Oral at bedtime  cyanocobalamin Injectable 1000 MICROGram(s) SubCutaneous daily  enoxaparin Injectable 40 milliGRAM(s) SubCutaneous every 24 hours  lisinopril 10 milliGRAM(s) Oral daily  metoprolol tartrate 50 milliGRAM(s) Oral two times a day      MEDICATIONS  (PRN):      Physical Exam    Neuro :  no focal deficits  Respiratory: CTA B/L  CV: RRR, S1S2, no murmurs,   Abdominal: Soft, NT, ND +BS,  Extremities: No edema, + peripheral pulses      ASSESSMENT    dizziness likely 2nd to vit b12 defficiency,   cns patho r/o,   h/o HTN      PLAN    d/c tele,   dizziness resolved  aspirin, statin,   neuro f/u    MRI Brain with No acute intracranial hemorrhage or acute infarct noted  .  Follow up in Neurology/Stroke clinic to determine TIA/stroke risk factors and to help determine if Aspirin and/or Atorvastatin needed to be maintained at the current doses  orthostatic bp neg  trop x1 neg noted    cardio f/u   echo with Ejection Fraction Visual Estimate: 55-60 %,Bubble study is nondiagnostic noted    vit b12 low noted   cont vit b12 1000mcg sq daily day 4/7 days  f/u intrinsic factor   tsh low noted    free t4 wnl  endo cons   phys tx eval noted and rec no skilled phys tx needs  cont current meds

## 2022-12-08 NOTE — PROGRESS NOTE ADULT - ASSESSMENT
71yo female with hx of HTN (not noted to be on medication), presents with dizziness,vit b12 def.  1.PT.  2.HTN-ace.  3.Vitamin b12 replacement.  4.Lipid d/o-statin.  5.Orthostatic bp-negative.  6.GI and DVT prophylaxis.
Patient is a [place] Zealander speaking 72 year old female with PMHx of HTN (not noted to be on medication). Patient presented to ED with dizziness, nausea and vomiting. CT head showing findings suggestive of Left ICA thrombus, afterwards Code Stroke was called CTA head and neck, CT perfusion noted to be normal. Patient admitted to telemetry for possible posterior circulation transient ischemic attack vs. ischemic stroke vs. GI infection.   
Patient is a Citizen of [place] speaking 72 year old female with PMHx of HTN (not noted to be on medication). Patient presented to ED with dizziness, nausea and vomiting. CT head showing findings suggestive of Left ICA thrombus, afterwards Code Stroke was called CTA head and neck, CT perfusion noted to be normal. Patient admitted to telemetry for possible posterior circulation transient ischemic attack vs. ischemic stroke vs. GI infection.   
 73yo female with hx of HTN (not noted to be on medication), presents with dizziness,vit b12 def.  1.Replace k+  2.HTN-ace.  3.Vitamin b12 replacement.  4.Lipid d/o-statin.  5.Orthostatic bp-negative.  6.GI and DVT prophylaxis.
 71yo female with hx of HTN (not noted to be on medication), presents with dizziness,vit b12 def.  1.D/C Tele monitoring.  2.Neurology eval noted.  3.HTN-ace.  4.Vitamin b12 replacement.  5.Lipid d/o-statin.  6.Orthostatic bp-negative.  7.GI and DVT prophylaxis.
Patient is a Lao speaking 72 year old female with PMHx of HTN (not noted to be on medication). Patient presented to ED with dizziness, nausea and vomiting. CT head showing findings suggestive of Left ICA thrombus, afterwards Code Stroke was called CTA head and neck, CT perfusion noted to be normal. Patient admitted to telemetry for possible posterior circulation transient ischemic attack vs. ischemic stroke vs. GI infection.   
Patient is a Ghanaian speaking 72 year old female with PMHx of HTN (not noted to be on medication). Patient presented to ED with dizziness, nausea and vomiting. CT head showing findings suggestive of Left ICA thrombus, afterwards Code Stroke was called CTA head and neck, CT perfusion noted to be normal. Patient admitted to telemetry for possible posterior circulation transient ischemic attack vs. ischemic stroke vs. GI infection.

## 2022-12-08 NOTE — DISCHARGE NOTE NURSING/CASE MANAGEMENT/SOCIAL WORK - PATIENT PORTAL LINK FT
You can access the FollowMyHealth Patient Portal offered by [facility] by registering at the following website: [email or web address]. By joining [facility]’s FollowMyHealth portal, you will also be able to view your health information using other applications (apps) compatible with our system.

## 2022-12-08 NOTE — PROGRESS NOTE ADULT - SUBJECTIVE AND OBJECTIVE BOX
CHIEF COMPLAINT:Patient is a 72y old  Female who presents with a chief complaint of Dizziness.Pt appears comfortable.    	  REVIEW OF SYSTEMS:  CONSTITUTIONAL: No fever, weight loss, or fatigue  EYES: No eye pain, visual disturbances, or discharge  ENT:  No difficulty hearing, tinnitus, vertigo; No sinus or throat pain  NECK: No pain or stiffness  RESPIRATORY: No cough, wheezing, chills or hemoptysis; No Shortness of Breath  CARDIOVASCULAR: No chest pain, palpitations, passing out, dizziness, or leg swelling  GASTROINTESTINAL: No abdominal or epigastric pain. No nausea, vomiting, or hematemesis; No diarrhea or constipation. No melena or hematochezia.  GENITOURINARY: No dysuria, frequency, hematuria, or incontinence  NEUROLOGICAL: No headaches, memory loss, loss of strength, numbness, or tremors  SKIN: No itching, burning, rashes, or lesions   LYMPH Nodes: No enlarged glands  ENDOCRINE: No heat or cold intolerance; No hair loss  MUSCULOSKELETAL: No joint pain or swelling; No muscle, back, or extremity pain  PSYCHIATRIC: No depression, anxiety, mood swings, or difficulty sleeping  HEME/LYMPH: No easy bruising, or bleeding gums  ALLERGY AND IMMUNOLOGIC: No hives or eczema	    PHYSICAL EXAM:  T(C): 36.6 (12-08-22 @ 05:13), Max: 36.8 (12-07-22 @ 14:37)  HR: 80 (12-08-22 @ 05:13) (78 - 89)  BP: 137/68 (12-08-22 @ 05:13) (126/66 - 153/79)  RR: 18 (12-08-22 @ 05:13) (17 - 18)  SpO2: 95% (12-08-22 @ 05:13) (95% - 99%)  Wt(kg): --  I&O's Summary      Appearance: Normal	  HEENT:   Normal oral mucosa, PERRL, EOMI	  Lymphatic: No lymphadenopathy  Cardiovascular: Normal S1 S2, No JVD, No murmurs, No edema  Respiratory: Lungs clear to auscultation	  Psychiatry: A & O x 3, Mood & affect appropriate  Gastrointestinal:  Soft, Non-tender, + BS	  Skin: No rashes, No ecchymoses, No cyanosis	  Neurologic: Non-focal  Extremities: Normal range of motion, No clubbing, cyanosis or edema  Vascular: Peripheral pulses palpable 2+ bilaterally    MEDICATIONS  (STANDING):  aspirin  chewable 81 milliGRAM(s) Oral daily  atorvastatin 40 milliGRAM(s) Oral at bedtime  cyanocobalamin Injectable 1000 MICROGram(s) SubCutaneous daily  enoxaparin Injectable 40 milliGRAM(s) SubCutaneous every 24 hours  lisinopril 10 milliGRAM(s) Oral daily  metoprolol tartrate 50 milliGRAM(s) Oral two times a day      	  LABS:	 	    CARDIAC MARKERS:                                13.1   6.38  )-----------( 349      ( 07 Dec 2022 05:26 )             40.6     12-07    144  |  107  |  18  ----------------------------<  104<H>  3.4<L>   |  27  |  0.89    Ca    9.8      07 Dec 2022 05:26  Phos  3.2     12-07  Mg     2.1     12-07    TPro  7.0  /  Alb  3.3<L>  /  TBili  0.8  /  DBili  x   /  AST  13  /  ALT  18  /  AlkPhos  79  12-07    proBNP:   Lipid Profile: Cholesterol 200  LDL --  HDL 65    Ldl calc 95  Ratio --    HgA1c:   TSH: Thyroid Stimulating Hormone, Serum: 0.19 uU/mL (12-05 @ 04:05)

## 2022-12-08 NOTE — PROGRESS NOTE ADULT - PROBLEM SELECTOR PLAN 3
DVT PPX: Lovenox  GI PPX: PPI

## 2022-12-08 NOTE — PROGRESS NOTE ADULT - PROBLEM SELECTOR PLAN 1
posterior circulation transient ischemic attack vs. ischemic stroke vs. gastrointestinal infection  CT head showing possible thrombus in Left ICA no acute hemorrhage, normal CTA head and neck   can be 2/2 to posterior CVA vs infectious causes vs orthostasis   EKG NSR, normal troponin level   continue aspirin and statin   continue telemetry monitoring   Echo- negative, bubble study inconclusive  MR brain- negative for acute hemorrhage, stroke  Neurology Dr. Joel   Cardiology Dr. Gibson  vitamin B12 deficiency- receiving B12 injections daily for 7 days, and then weekly B12 injections for 4 weeks, then oral  vivo meds sent, and instructions with syringes given

## 2022-12-08 NOTE — PROGRESS NOTE ADULT - REASON FOR ADMISSION
Dizziness

## 2022-12-10 LAB — IF BLOCK AB SER-ACNC: 1.8 AU/ML — HIGH (ref 0–1.1)

## 2023-12-15 NOTE — SWALLOW BEDSIDE ASSESSMENT ADULT - ASR SWALLOW ASPIRATION MONITOR
Patient called and left message on voicemail requesting a refill of ondansetron stating that she was given the medication by another provider and she is having nausea now. Medication is not on patient's current medication ,list. Please advise.
change of breathing pattern/oral hygiene/position upright (89Y)/cough/gurgly voice/fever/pneumonia/throat clearing/upper respiratory infection